# Patient Record
Sex: MALE | Race: WHITE | NOT HISPANIC OR LATINO
[De-identification: names, ages, dates, MRNs, and addresses within clinical notes are randomized per-mention and may not be internally consistent; named-entity substitution may affect disease eponyms.]

---

## 2017-05-26 ENCOUNTER — APPOINTMENT (OUTPATIENT)
Dept: UROLOGY | Facility: CLINIC | Age: 67
End: 2017-05-26

## 2017-05-26 VITALS
WEIGHT: 200 LBS | SYSTOLIC BLOOD PRESSURE: 130 MMHG | RESPIRATION RATE: 16 BRPM | OXYGEN SATURATION: 98 % | HEART RATE: 84 BPM | HEIGHT: 69 IN | BODY MASS INDEX: 29.62 KG/M2 | DIASTOLIC BLOOD PRESSURE: 80 MMHG | TEMPERATURE: 98.2 F

## 2017-05-26 DIAGNOSIS — E78.00 PURE HYPERCHOLESTEROLEMIA, UNSPECIFIED: ICD-10-CM

## 2017-05-26 DIAGNOSIS — N48.89 OTHER SPECIFIED DISORDERS OF PENIS: ICD-10-CM

## 2017-05-26 DIAGNOSIS — N50.89 OTHER SPECIFIED DISORDERS OF THE MALE GENITAL ORGANS: ICD-10-CM

## 2017-05-26 DIAGNOSIS — Z86.79 PERSONAL HISTORY OF OTHER DISEASES OF THE CIRCULATORY SYSTEM: ICD-10-CM

## 2017-06-01 PROBLEM — Z86.79 HISTORY OF HYPERTENSION: Status: RESOLVED | Noted: 2017-05-26 | Resolved: 2017-06-01

## 2017-06-01 PROBLEM — N50.89 SCROTAL EDEMA: Status: ACTIVE | Noted: 2017-06-01

## 2017-06-01 PROBLEM — N48.89 PENILE EDEMA: Status: ACTIVE | Noted: 2017-06-01

## 2017-06-01 PROBLEM — E78.00 HIGH CHOLESTEROL: Status: RESOLVED | Noted: 2017-05-26 | Resolved: 2017-06-01

## 2017-11-29 ENCOUNTER — APPOINTMENT (OUTPATIENT)
Dept: CT IMAGING | Facility: CLINIC | Age: 67
End: 2017-11-29
Payer: MEDICARE

## 2017-11-29 ENCOUNTER — OUTPATIENT (OUTPATIENT)
Dept: OUTPATIENT SERVICES | Facility: HOSPITAL | Age: 67
LOS: 1 days | End: 2017-11-29
Payer: MEDICARE

## 2017-11-29 DIAGNOSIS — Z00.8 ENCOUNTER FOR OTHER GENERAL EXAMINATION: ICD-10-CM

## 2017-11-29 PROCEDURE — G0297: CPT

## 2017-11-29 PROCEDURE — G0297: CPT | Mod: 26

## 2019-02-05 ENCOUNTER — APPOINTMENT (OUTPATIENT)
Dept: INTERNAL MEDICINE | Facility: CLINIC | Age: 69
End: 2019-02-05

## 2019-02-05 ENCOUNTER — RECORD ABSTRACTING (OUTPATIENT)
Age: 69
End: 2019-02-05

## 2019-02-05 DIAGNOSIS — Z87.19 PERSONAL HISTORY OF OTHER DISEASES OF THE DIGESTIVE SYSTEM: ICD-10-CM

## 2019-02-05 DIAGNOSIS — J42 UNSPECIFIED CHRONIC BRONCHITIS: ICD-10-CM

## 2019-02-05 DIAGNOSIS — Z86.79 PERSONAL HISTORY OF OTHER DISEASES OF THE CIRCULATORY SYSTEM: ICD-10-CM

## 2019-02-05 DIAGNOSIS — S39.91XA UNSPECIFIED INJURY OF ABDOMEN, INITIAL ENCOUNTER: ICD-10-CM

## 2019-03-25 ENCOUNTER — MEDICATION RENEWAL (OUTPATIENT)
Age: 69
End: 2019-03-25

## 2019-04-19 ENCOUNTER — RX RENEWAL (OUTPATIENT)
Age: 69
End: 2019-04-19

## 2019-04-23 ENCOUNTER — MEDICATION RENEWAL (OUTPATIENT)
Age: 69
End: 2019-04-23

## 2019-05-20 ENCOUNTER — MEDICATION RENEWAL (OUTPATIENT)
Age: 69
End: 2019-05-20

## 2019-06-03 ENCOUNTER — APPOINTMENT (OUTPATIENT)
Dept: INTERNAL MEDICINE | Facility: CLINIC | Age: 69
End: 2019-06-03
Payer: MEDICARE

## 2019-06-03 VITALS
TEMPERATURE: 97.4 F | DIASTOLIC BLOOD PRESSURE: 65 MMHG | BODY MASS INDEX: 29.62 KG/M2 | SYSTOLIC BLOOD PRESSURE: 128 MMHG | OXYGEN SATURATION: 98 % | WEIGHT: 200 LBS | HEIGHT: 69 IN | RESPIRATION RATE: 16 BRPM | HEART RATE: 47 BPM

## 2019-06-03 PROCEDURE — 99213 OFFICE O/P EST LOW 20 MIN: CPT

## 2019-06-03 NOTE — HISTORY OF PRESENT ILLNESS
[FreeTextEntry1] : Follow up\par \par BP   on meds\par Nocutia interferes with sleep\par Adherent to meds as prescribed\par No weight change\par \par Smoking  unchanged \par Willing to try Chantix [de-identified] : BT  8/18   sugar reasonable\par EKG  reasonable

## 2019-06-03 NOTE — PHYSICAL EXAM
[No Acute Distress] : no acute distress [Well Nourished] : well nourished [Well Developed] : well developed [Well-Appearing] : well-appearing [Normal Sclera/Conjunctiva] : normal sclera/conjunctiva [PERRL] : pupils equal round and reactive to light [EOMI] : extraocular movements intact [Normal Outer Ear/Nose] : the outer ears and nose were normal in appearance [Normal Oropharynx] : the oropharynx was normal [Supple] : supple [No JVD] : no jugular venous distention [No Lymphadenopathy] : no lymphadenopathy [Thyroid Normal, No Nodules] : the thyroid was normal and there were no nodules present [No Respiratory Distress] : no respiratory distress  [Clear to Auscultation] : lungs were clear to auscultation bilaterally [No Accessory Muscle Use] : no accessory muscle use [Normal Rate] : normal rate  [Regular Rhythm] : with a regular rhythm [Normal S1, S2] : normal S1 and S2 [No Murmur] : no murmur heard [No Carotid Bruits] : no carotid bruits [Pedal Pulses Present] : the pedal pulses are present [No Abdominal Bruit] : a ~M bruit was not heard ~T in the abdomen [No Varicosities] : no varicosities [No Edema] : there was no peripheral edema [No Extremity Clubbing/Cyanosis] : no extremity clubbing/cyanosis [No Palpable Aorta] : no palpable aorta [Soft] : abdomen soft [Non Tender] : non-tender [Non-distended] : non-distended [No Masses] : no abdominal mass palpated [No HSM] : no HSM [Normal Posterior Cervical Nodes] : no posterior cervical lymphadenopathy [Normal Bowel Sounds] : normal bowel sounds [Normal Anterior Cervical Nodes] : no anterior cervical lymphadenopathy [No Spinal Tenderness] : no spinal tenderness [No CVA Tenderness] : no CVA  tenderness [Grossly Normal Strength/Tone] : grossly normal strength/tone [No Joint Swelling] : no joint swelling [No Rash] : no rash [Normal Gait] : normal gait [Coordination Grossly Intact] : coordination grossly intact [No Focal Deficits] : no focal deficits [Deep Tendon Reflexes (DTR)] : deep tendon reflexes were 2+ and symmetric [Normal Affect] : the affect was normal [Normal Insight/Judgement] : insight and judgment were intact

## 2019-06-14 ENCOUNTER — RX RENEWAL (OUTPATIENT)
Age: 69
End: 2019-06-14

## 2019-07-03 RX ORDER — IRBESARTAN 300 MG/1
300 TABLET ORAL DAILY
Refills: 0 | Status: DISCONTINUED | COMMUNITY
End: 2019-07-03

## 2019-07-15 RX ORDER — OLMESARTAN MEDOXOMIL 40 MG/1
40 TABLET, FILM COATED ORAL DAILY
Qty: 90 | Refills: 3 | Status: DISCONTINUED | COMMUNITY
Start: 2019-07-03 | End: 2019-07-15

## 2019-07-16 ENCOUNTER — APPOINTMENT (OUTPATIENT)
Dept: INTERNAL MEDICINE | Facility: CLINIC | Age: 69
End: 2019-07-16
Payer: MEDICARE

## 2019-07-17 ENCOUNTER — APPOINTMENT (OUTPATIENT)
Dept: INTERNAL MEDICINE | Facility: CLINIC | Age: 69
End: 2019-07-17
Payer: MEDICARE

## 2019-07-17 VITALS
DIASTOLIC BLOOD PRESSURE: 61 MMHG | HEIGHT: 69 IN | BODY MASS INDEX: 27.85 KG/M2 | HEART RATE: 46 BPM | OXYGEN SATURATION: 95 % | RESPIRATION RATE: 16 BRPM | SYSTOLIC BLOOD PRESSURE: 105 MMHG | WEIGHT: 188 LBS

## 2019-07-17 PROCEDURE — 99214 OFFICE O/P EST MOD 30 MIN: CPT

## 2019-07-17 RX ORDER — LISINOPRIL 40 MG/1
40 TABLET ORAL
Qty: 90 | Refills: 0 | Status: DISCONTINUED | COMMUNITY
Start: 2017-04-18 | End: 2019-07-17

## 2019-07-17 NOTE — PHYSICAL EXAM
[No Respiratory Distress] : no respiratory distress  [No Accessory Muscle Use] : no accessory muscle use [Clear to Auscultation] : lungs were clear to auscultation bilaterally [Normal Percussion] : the chest was normal to percussion [Normal Rate] : normal rate  [Regular Rhythm] : with a regular rhythm [Normal S1, S2] : normal S1 and S2 [No Edema] : there was no peripheral edema [No Extremity Clubbing/Cyanosis] : no extremity clubbing/cyanosis [de-identified] : Pedal pulses decreased in volume    PT/ DP  R and L   Toes warm   capillary filling looks ok

## 2019-07-17 NOTE — HISTORY OF PRESENT ILLNESS
[FreeTextEntry1] : F/U\par \par Just starting Chantix\par Still smoking\par \par Prior prior "Lung Cancer Screening" CT chest neg [de-identified] : New:  \par Walking induced pain in both legs  "entire leg"   "100 feet"   \par Rest   relief in a "couple of minutes" \par

## 2019-07-17 NOTE — ASSESSMENT
[FreeTextEntry1] : Likely has advancing atheromatous disease\par \par COPD   with continuation of smoking

## 2019-07-25 VITALS — HEIGHT: 69 IN | WEIGHT: 199 LBS | BODY MASS INDEX: 29.47 KG/M2

## 2019-07-28 ENCOUNTER — FORM ENCOUNTER (OUTPATIENT)
Age: 69
End: 2019-07-28

## 2019-07-29 ENCOUNTER — APPOINTMENT (OUTPATIENT)
Dept: CT IMAGING | Facility: CLINIC | Age: 69
End: 2019-07-29
Payer: MEDICARE

## 2019-07-29 ENCOUNTER — OUTPATIENT (OUTPATIENT)
Dept: OUTPATIENT SERVICES | Facility: HOSPITAL | Age: 69
LOS: 1 days | End: 2019-07-29
Payer: MEDICARE

## 2019-07-29 DIAGNOSIS — Z00.8 ENCOUNTER FOR OTHER GENERAL EXAMINATION: ICD-10-CM

## 2019-07-29 PROCEDURE — G0297: CPT | Mod: 26

## 2019-07-29 PROCEDURE — G0297: CPT

## 2019-08-20 ENCOUNTER — APPOINTMENT (OUTPATIENT)
Dept: INTERNAL MEDICINE | Facility: CLINIC | Age: 69
End: 2019-08-20
Payer: MEDICARE

## 2019-08-20 ENCOUNTER — NON-APPOINTMENT (OUTPATIENT)
Age: 69
End: 2019-08-20

## 2019-08-20 VITALS
BODY MASS INDEX: 29.54 KG/M2 | DIASTOLIC BLOOD PRESSURE: 60 MMHG | WEIGHT: 200 LBS | HEART RATE: 48 BPM | SYSTOLIC BLOOD PRESSURE: 118 MMHG

## 2019-08-20 DIAGNOSIS — I70.90 UNSPECIFIED ATHEROSCLEROSIS: ICD-10-CM

## 2019-08-20 PROCEDURE — 99214 OFFICE O/P EST MOD 30 MIN: CPT | Mod: 25

## 2019-08-20 PROCEDURE — 36415 COLL VENOUS BLD VENIPUNCTURE: CPT

## 2019-08-20 PROCEDURE — 93000 ELECTROCARDIOGRAM COMPLETE: CPT

## 2019-08-20 NOTE — PHYSICAL EXAM
[No Respiratory Distress] : no respiratory distress  [No Accessory Muscle Use] : no accessory muscle use [Clear to Auscultation] : lungs were clear to auscultation bilaterally [Normal Percussion] : the chest was normal to percussion [Normal Rate] : normal rate  [Regular Rhythm] : with a regular rhythm [Normal S1, S2] : normal S1 and S2 [No Edema] : there was no peripheral edema [No Extremity Clubbing/Cyanosis] : no extremity clubbing/cyanosis [de-identified] : Pedal pulses decreased in volume    PT/ DP  R and L   Toes warm   capillary filling looks ok

## 2019-08-20 NOTE — ASSESSMENT
[FreeTextEntry1] : Same meds\par Continue with "withdrawl" attempt from cigs\par To  see vasc surg\par \par Call for BT report in a week \par R CLIFF  3 m

## 2019-08-20 NOTE — HISTORY OF PRESENT ILLNESS
[FreeTextEntry1] : F/U   several issues  [de-identified] : COPD    CT chest   nodules unchanged  no mass  (lung cancer screening)\par \par Smoking   on Chantix    No reduction yet in cigarettes\par \par Arteriosclerosis / walking impairment   to see vasc surg   [place]\par \par Has not had dental restorations\par \par Adherent to taking meds\par No apparent side effects\par \par

## 2019-08-20 NOTE — RESULTS/DATA
[Ventricular Rate___] : ventricular rate is [unfilled] beats per minute [Sinus Bradycardia] : sinus bradycardia [No Interval Change] : no interval change

## 2019-08-21 LAB
ALBUMIN SERPL ELPH-MCNC: 4.2 G/DL
ALP BLD-CCNC: 98 U/L
ALT SERPL-CCNC: 16 U/L
ANION GAP SERPL CALC-SCNC: 14 MMOL/L
AST SERPL-CCNC: 13 U/L
BASOPHILS # BLD AUTO: 0.06 K/UL
BASOPHILS NFR BLD AUTO: 0.7 %
BILIRUB SERPL-MCNC: 0.5 MG/DL
BUN SERPL-MCNC: 21 MG/DL
CALCIUM SERPL-MCNC: 9.9 MG/DL
CHLORIDE SERPL-SCNC: 102 MMOL/L
CHOLEST SERPL-MCNC: 180 MG/DL
CHOLEST/HDLC SERPL: 5.1 RATIO
CK SERPL-CCNC: 109 U/L
CO2 SERPL-SCNC: 23 MMOL/L
CREAT SERPL-MCNC: 1.42 MG/DL
EOSINOPHIL # BLD AUTO: 0.09 K/UL
EOSINOPHIL NFR BLD AUTO: 1 %
ESTIMATED AVERAGE GLUCOSE: 151 MG/DL
GGT SERPL-CCNC: 36 U/L
GLUCOSE SERPL-MCNC: 116 MG/DL
HBA1C MFR BLD HPLC: 6.9 %
HCT VFR BLD CALC: 45 %
HDLC SERPL-MCNC: 35 MG/DL
HGB BLD-MCNC: 14.2 G/DL
IMM GRANULOCYTES NFR BLD AUTO: 0.3 %
LDLC SERPL CALC-MCNC: 92 MG/DL
LYMPHOCYTES # BLD AUTO: 2.44 K/UL
LYMPHOCYTES NFR BLD AUTO: 26.8 %
MAN DIFF?: NORMAL
MCHC RBC-ENTMCNC: 30.9 PG
MCHC RBC-ENTMCNC: 31.6 GM/DL
MCV RBC AUTO: 98 FL
MONOCYTES # BLD AUTO: 0.72 K/UL
MONOCYTES NFR BLD AUTO: 7.9 %
NEUTROPHILS # BLD AUTO: 5.75 K/UL
NEUTROPHILS NFR BLD AUTO: 63.3 %
PLATELET # BLD AUTO: 287 K/UL
POTASSIUM SERPL-SCNC: 4.4 MMOL/L
PROT SERPL-MCNC: 6.5 G/DL
RBC # BLD: 4.59 M/UL
RBC # FLD: 13.7 %
SODIUM SERPL-SCNC: 139 MMOL/L
TRIGL SERPL-MCNC: 265 MG/DL
WBC # FLD AUTO: 9.09 K/UL

## 2019-08-27 ENCOUNTER — APPOINTMENT (OUTPATIENT)
Dept: VASCULAR SURGERY | Facility: CLINIC | Age: 69
End: 2019-08-27
Payer: MEDICARE

## 2019-08-27 VITALS
WEIGHT: 200 LBS | BODY MASS INDEX: 29.62 KG/M2 | DIASTOLIC BLOOD PRESSURE: 62 MMHG | TEMPERATURE: 97.6 F | HEART RATE: 45 BPM | OXYGEN SATURATION: 96 % | SYSTOLIC BLOOD PRESSURE: 130 MMHG | HEIGHT: 69 IN

## 2019-08-27 PROCEDURE — 99203 OFFICE O/P NEW LOW 30 MIN: CPT

## 2019-08-27 PROCEDURE — 93923 UPR/LXTR ART STDY 3+ LVLS: CPT

## 2019-09-26 ENCOUNTER — MEDICATION RENEWAL (OUTPATIENT)
Age: 69
End: 2019-09-26

## 2019-10-15 ENCOUNTER — APPOINTMENT (OUTPATIENT)
Dept: VASCULAR SURGERY | Facility: CLINIC | Age: 69
End: 2019-10-15
Payer: MEDICARE

## 2019-10-15 VITALS
TEMPERATURE: 97.8 F | BODY MASS INDEX: 29.98 KG/M2 | WEIGHT: 202.38 LBS | HEART RATE: 41 BPM | HEIGHT: 69 IN | DIASTOLIC BLOOD PRESSURE: 78 MMHG | SYSTOLIC BLOOD PRESSURE: 149 MMHG | OXYGEN SATURATION: 97 %

## 2019-10-15 PROCEDURE — 99214 OFFICE O/P EST MOD 30 MIN: CPT

## 2019-10-15 PROCEDURE — 93978 VASCULAR STUDY: CPT

## 2019-11-11 ENCOUNTER — APPOINTMENT (OUTPATIENT)
Dept: INTERNAL MEDICINE | Facility: CLINIC | Age: 69
End: 2019-11-11
Payer: MEDICARE

## 2019-11-11 VITALS — BODY MASS INDEX: 29.33 KG/M2 | WEIGHT: 198 LBS | HEIGHT: 69 IN

## 2019-11-11 VITALS — HEART RATE: 45 BPM | SYSTOLIC BLOOD PRESSURE: 106 MMHG | DIASTOLIC BLOOD PRESSURE: 57 MMHG

## 2019-11-11 DIAGNOSIS — Z82.49 FAMILY HISTORY OF ISCHEMIC HEART DISEASE AND OTHER DISEASES OF THE CIRCULATORY SYSTEM: ICD-10-CM

## 2019-11-11 DIAGNOSIS — Z01.818 ENCOUNTER FOR OTHER PREPROCEDURAL EXAMINATION: ICD-10-CM

## 2019-11-11 DIAGNOSIS — M15.9 POLYOSTEOARTHRITIS, UNSPECIFIED: ICD-10-CM

## 2019-11-11 PROCEDURE — 99214 OFFICE O/P EST MOD 30 MIN: CPT

## 2019-11-11 NOTE — HISTORY OF PRESENT ILLNESS
[No Pertinent Cardiac History] : no history of aortic stenosis, atrial fibrillation, coronary artery disease, recent myocardial infarction, or implantable device/pacemaker [COPD] : COPD [Smoker] : smoker [No Adverse Anesthesia Reaction] : no adverse anesthesia reaction in self or family member [Diabetes] : diabetes [Aortic Stenosis] : no aortic stenosis [Coronary Artery Disease] : no coronary artery disease [Atrial Fibrillation] : no atrial fibrillation [Recent Myocardial Infarction] : no recent myocardial infarction [Implantable Device/Pacemaker] : no implantable device/pacemaker [Sleep Apnea] : no sleep apnea [Chronic Kidney Disease] : no chronic kidney disease [Chronic Anticoagulation] : no chronic anticoagulation [FreeTextEntry1] : Cataract extraction OD and OS with IOL [FreeTextEntry4] : Recently seen by vasc surg\par Claudication\par Declined recommended Pletal\par AAA scan negative\par \par SMOKER\par \par Evidence of advanced arteriosclerosis\par \par Evidence of chronic lung changes associated with smoking but CT chest screening for neoplasm:  Negative 3 months ago.   [FreeTextEntry2] : Thursday Nov 14, 2019 (OD) and Thursday December 5, 2019 (OS)   [FreeTextEntry3] : Maritza Cooper MD

## 2019-11-11 NOTE — PHYSICAL EXAM
[No Respiratory Distress] : no respiratory distress  [Clear to Auscultation] : lungs were clear to auscultation bilaterally [No Accessory Muscle Use] : no accessory muscle use [Normal Percussion] : the chest was normal to percussion [Normal Rate] : normal rate  [Regular Rhythm] : with a regular rhythm [Normal S1, S2] : normal S1 and S2 [No Edema] : there was no peripheral edema [No Extremity Clubbing/Cyanosis] : no extremity clubbing/cyanosis [de-identified] : Pedal pulses decreased in volume    PT/ DP  R and L   Toes warm   capillary filling looks ok

## 2019-11-11 NOTE — RESULTS/DATA
[] : results reviewed [de-identified] : acceptable [de-identified] : acceptable [de-identified] : sinus bradycardia

## 2019-11-11 NOTE — CONSULT LETTER
[Dear  ___] : Dear  [unfilled], [Consult Letter:] : I had the pleasure of evaluating your patient, [unfilled]. [Please see my note below.] : Please see my note below. [Consult Closing:] : Thank you very much for allowing me to participate in the care of this patient.  If you have any questions, please do not hesitate to contact me. [FreeTextEntry2] : Maritza Cooper MD\par [facility]\par [place], [place]

## 2019-11-18 ENCOUNTER — RX RENEWAL (OUTPATIENT)
Age: 69
End: 2019-11-18

## 2019-12-27 ENCOUNTER — RX RENEWAL (OUTPATIENT)
Age: 69
End: 2019-12-27

## 2020-02-16 ENCOUNTER — RX RENEWAL (OUTPATIENT)
Age: 70
End: 2020-02-16

## 2020-03-23 ENCOUNTER — RX RENEWAL (OUTPATIENT)
Age: 70
End: 2020-03-23

## 2020-03-29 ENCOUNTER — NON-APPOINTMENT (OUTPATIENT)
Age: 70
End: 2020-03-29

## 2020-05-05 ENCOUNTER — APPOINTMENT (OUTPATIENT)
Dept: INTERNAL MEDICINE | Facility: CLINIC | Age: 70
End: 2020-05-05
Payer: MEDICARE

## 2020-05-05 VITALS
WEIGHT: 205 LBS | TEMPERATURE: 97.4 F | BODY MASS INDEX: 30.36 KG/M2 | HEIGHT: 69 IN | SYSTOLIC BLOOD PRESSURE: 130 MMHG | HEART RATE: 59 BPM | OXYGEN SATURATION: 97 % | DIASTOLIC BLOOD PRESSURE: 67 MMHG

## 2020-05-05 DIAGNOSIS — M25.562 PAIN IN LEFT KNEE: ICD-10-CM

## 2020-05-05 PROCEDURE — 99204 OFFICE O/P NEW MOD 45 MIN: CPT | Mod: 25

## 2020-05-05 PROCEDURE — 93922 UPR/L XTREMITY ART 2 LEVELS: CPT

## 2020-05-05 PROCEDURE — 99214 OFFICE O/P EST MOD 30 MIN: CPT | Mod: 25

## 2020-05-05 PROCEDURE — G0442 ANNUAL ALCOHOL SCREEN 15 MIN: CPT

## 2020-05-05 RX ORDER — CILOSTAZOL 100 MG/1
100 TABLET ORAL TWICE DAILY
Qty: 60 | Refills: 6 | Status: DISCONTINUED | COMMUNITY
Start: 2019-10-15 | End: 2020-05-05

## 2020-05-05 RX ORDER — FUROSEMIDE 20 MG/1
20 TABLET ORAL
Qty: 30 | Refills: 0 | Status: DISCONTINUED | COMMUNITY
Start: 2017-02-23 | End: 2020-05-05

## 2020-05-05 RX ORDER — VARENICLINE TARTRATE 0.5 (11)-1
0.5 MG X 11 & KIT ORAL
Qty: 1 | Refills: 0 | Status: DISCONTINUED | COMMUNITY
Start: 2019-06-03 | End: 2020-05-05

## 2020-05-05 RX ORDER — KETOCONAZOLE 20 MG/G
2 CREAM TOPICAL
Qty: 120 | Refills: 0 | Status: DISCONTINUED | COMMUNITY
Start: 2017-05-25 | End: 2020-05-05

## 2020-05-05 NOTE — HEALTH RISK ASSESSMENT
[Good] : ~his/her~ current health as good [Never (0 pts)] : Never (0 points) [1 or 2 (0 pts)] : 1 or 2 (0 points) [No falls in past year] : Patient reported no falls in the past year [No] : In the past 12 months have you used drugs other than those required for medical reasons? No [0] : 1) Little interest or pleasure doing things: Not at all (0) [Patient reported colonoscopy was normal] : Patient reported colonoscopy was normal [None] : None [Retired] : retired [With Significant Other] : lives with significant other [] :  [# Of Children ___] : has [unfilled] children [Feels Safe at Home] : Feels safe at home [Fully functional (bathing, dressing, toileting, transferring, walking, feeding)] : Fully functional (bathing, dressing, toileting, transferring, walking, feeding) [Fully functional (using the telephone, shopping, preparing meals, housekeeping, doing laundry, using] : Fully functional and needs no help or supervision to perform IADLs (using the telephone, shopping, preparing meals, housekeeping, doing laundry, using transportation, managing medications and managing finances) [Audit-CScore] : 0 [[id number]] : 0 [Change in mental status noted] : No change in mental status noted [ColonoscopyDate] : 8/2016

## 2020-05-05 NOTE — PHYSICAL EXAM
[No Acute Distress] : no acute distress [Well Nourished] : well nourished [Well Developed] : well developed [Normal Sclera/Conjunctiva] : normal sclera/conjunctiva [Well-Appearing] : well-appearing [PERRL] : pupils equal round and reactive to light [Normal Outer Ear/Nose] : the outer ears and nose were normal in appearance [EOMI] : extraocular movements intact [Normal Oropharynx] : the oropharynx was normal [No JVD] : no jugular venous distention [No Lymphadenopathy] : no lymphadenopathy [Supple] : supple [Thyroid Normal, No Nodules] : the thyroid was normal and there were no nodules present [No Respiratory Distress] : no respiratory distress  [No Accessory Muscle Use] : no accessory muscle use [Clear to Auscultation] : lungs were clear to auscultation bilaterally [Normal Rate] : normal rate  [Regular Rhythm] : with a regular rhythm [Normal S1, S2] : normal S1 and S2 [No Murmur] : no murmur heard [No Carotid Bruits] : no carotid bruits [No Abdominal Bruit] : a ~M bruit was not heard ~T in the abdomen [No Varicosities] : no varicosities [No Edema] : there was no peripheral edema [No Palpable Aorta] : no palpable aorta [No Extremity Clubbing/Cyanosis] : no extremity clubbing/cyanosis [Soft] : abdomen soft [Non Tender] : non-tender [Non-distended] : non-distended [No Masses] : no abdominal mass palpated [No HSM] : no HSM [Normal Bowel Sounds] : normal bowel sounds [Normal Posterior Cervical Nodes] : no posterior cervical lymphadenopathy [Normal Anterior Cervical Nodes] : no anterior cervical lymphadenopathy [No CVA Tenderness] : no CVA  tenderness [No Spinal Tenderness] : no spinal tenderness [No Joint Swelling] : no joint swelling [Grossly Normal Strength/Tone] : grossly normal strength/tone [No Rash] : no rash [Coordination Grossly Intact] : coordination grossly intact [No Focal Deficits] : no focal deficits [Normal Gait] : normal gait [Deep Tendon Reflexes (DTR)] : deep tendon reflexes were 2+ and symmetric [Normal Affect] : the affect was normal [Normal Insight/Judgement] : insight and judgment were intact [de-identified] : left knee tender but no warmth or swelling [de-identified] : decreased pedal pulses

## 2020-05-05 NOTE — HISTORY OF PRESENT ILLNESS
[FreeTextEntry1] : New patient taking over care. HTN\par  COPD, current smoker. , HTN and cholesterol. \par  he is c/o pain in his left knee.  cant walk any distances he gets pain in his knee . he gets pain in his calves and his left leg after walking a few minutes. advil helps a little\par  had vascular w/u about 8 months by Dr. Tran

## 2020-05-12 DIAGNOSIS — M17.10 UNILATERAL PRIMARY OSTEOARTHRITIS, UNSPECIFIED KNEE: ICD-10-CM

## 2020-05-19 ENCOUNTER — RX RENEWAL (OUTPATIENT)
Age: 70
End: 2020-05-19

## 2020-08-06 VITALS — WEIGHT: 199 LBS | HEIGHT: 69 IN | BODY MASS INDEX: 29.47 KG/M2

## 2020-08-06 DIAGNOSIS — Z12.2 ENCOUNTER FOR SCREENING FOR MALIGNANT NEOPLASM OF RESPIRATORY ORGANS: ICD-10-CM

## 2020-08-06 NOTE — PLAN
[[facility]] : referred to [facility] [phone] [Smoking Cessation] : smoking cessation [Regular follow-up with healthcare provider] : regular follow-up with healthcare provider [Regular Exercise] : regular exercise [Age appropriate screenings] : Age appropriate screenings [FreeTextEntry1] : HIs LDCT is scheduled for 8/7/20 at the [place] location.  He will follow up with Dr. Paz for the results.

## 2020-08-06 NOTE — ASSESSMENT
[Discussed Cessation Strategies] : cessation strategies were discussed [Ready] : Patient is ready for cessation intervention [Discussed Risks and Advised to Quit Smoking] : Discussed risks and advised to quit smoking [Contemplation] : Contemplation: The patient is considering quitting smoking

## 2020-08-06 NOTE — REASON FOR VISIT
[Annual Follow-Up] : an annual follow-up visit [Low-Dose CT Screening Discussion] : low-dose CT lung cancer screening discussion [Review of Eligibility] : review of eligibility

## 2020-08-06 NOTE — HISTORY OF PRESENT ILLNESS
[Current] : current smoker [TextBox_13] : He denies hemoptysis, denies new cough, denies unexplained weight loss.\par He is a current smoker with a 56 pack year smoking history (1PPD x 56 years). He denies family h/o lung cancer. He is referred by Dr. Alden Paz.\par  [_____ pack-years] : [unfilled] pack-years

## 2020-08-07 ENCOUNTER — APPOINTMENT (OUTPATIENT)
Dept: CT IMAGING | Facility: CLINIC | Age: 70
End: 2020-08-07
Payer: MEDICARE

## 2020-08-07 ENCOUNTER — OUTPATIENT (OUTPATIENT)
Dept: OUTPATIENT SERVICES | Facility: HOSPITAL | Age: 70
LOS: 1 days | End: 2020-08-07
Payer: MEDICARE

## 2020-08-07 DIAGNOSIS — Z00.8 ENCOUNTER FOR OTHER GENERAL EXAMINATION: ICD-10-CM

## 2020-08-07 PROCEDURE — G0297: CPT | Mod: 26

## 2020-08-07 PROCEDURE — G0297: CPT

## 2020-08-20 ENCOUNTER — APPOINTMENT (OUTPATIENT)
Dept: INTERNAL MEDICINE | Facility: CLINIC | Age: 70
End: 2020-08-20
Payer: MEDICARE

## 2020-08-20 VITALS
DIASTOLIC BLOOD PRESSURE: 74 MMHG | HEIGHT: 69 IN | BODY MASS INDEX: 29.62 KG/M2 | WEIGHT: 200 LBS | OXYGEN SATURATION: 96 % | SYSTOLIC BLOOD PRESSURE: 134 MMHG | HEART RATE: 65 BPM

## 2020-08-20 PROCEDURE — 99214 OFFICE O/P EST MOD 30 MIN: CPT

## 2020-09-23 ENCOUNTER — APPOINTMENT (OUTPATIENT)
Dept: INTERNAL MEDICINE | Facility: CLINIC | Age: 70
End: 2020-09-23

## 2020-10-17 DIAGNOSIS — Z01.818 ENCOUNTER FOR OTHER PREPROCEDURAL EXAMINATION: ICD-10-CM

## 2020-10-18 ENCOUNTER — APPOINTMENT (OUTPATIENT)
Dept: DISASTER EMERGENCY | Facility: CLINIC | Age: 70
End: 2020-10-18

## 2020-10-20 LAB — SARS-COV-2 N GENE NPH QL NAA+PROBE: NOT DETECTED

## 2020-10-21 ENCOUNTER — APPOINTMENT (OUTPATIENT)
Dept: INTERNAL MEDICINE | Facility: CLINIC | Age: 70
End: 2020-10-21
Payer: MEDICARE

## 2020-10-21 PROCEDURE — 94727 GAS DIL/WSHOT DETER LNG VOL: CPT

## 2020-10-21 PROCEDURE — 94010 BREATHING CAPACITY TEST: CPT

## 2020-10-21 PROCEDURE — 94729 DIFFUSING CAPACITY: CPT

## 2020-12-11 ENCOUNTER — APPOINTMENT (OUTPATIENT)
Dept: INTERNAL MEDICINE | Facility: CLINIC | Age: 70
End: 2020-12-11
Payer: MEDICARE

## 2020-12-11 VITALS
HEART RATE: 66 BPM | WEIGHT: 199 LBS | BODY MASS INDEX: 29.47 KG/M2 | DIASTOLIC BLOOD PRESSURE: 61 MMHG | OXYGEN SATURATION: 98 % | SYSTOLIC BLOOD PRESSURE: 130 MMHG | HEIGHT: 69 IN

## 2020-12-11 DIAGNOSIS — F17.209 NICOTINE DEPENDENCE, UNSPECIFIED, WITH UNSPECIFIED NICOTINE-INDUCED DISORDERS: ICD-10-CM

## 2020-12-11 DIAGNOSIS — R06.02 SHORTNESS OF BREATH: ICD-10-CM

## 2020-12-11 PROCEDURE — 99213 OFFICE O/P EST LOW 20 MIN: CPT

## 2020-12-11 PROCEDURE — 99072 ADDL SUPL MATRL&STAF TM PHE: CPT

## 2020-12-11 NOTE — PHYSICAL EXAM
[Normal] : no acute distress, well nourished, well developed and well-appearing [Normal Sclera/Conjunctiva] : normal sclera/conjunctiva [Normal Outer Ear/Nose] : the outer ears and nose were normal in appearance [No JVD] : no jugular venous distention [No Respiratory Distress] : no respiratory distress  [No Accessory Muscle Use] : no accessory muscle use [Clear to Auscultation] : lungs were clear to auscultation bilaterally [Normal Rate] : normal rate  [Regular Rhythm] : with a regular rhythm [Normal S1, S2] : normal S1 and S2 [No Edema] : there was no peripheral edema

## 2020-12-11 NOTE — HISTORY OF PRESENT ILLNESS
[FreeTextEntry1] : feeling  ice pick stabbing  pains in left anlke  and right knee. says when goes  down  on knees legs feel to weak  to pull himself up. he walks only twenty foot before his calf and upper lug bother him   says he tried nicotine gum for a  week and not the  patch.  he is still sob  but all -pfts

## 2020-12-18 ENCOUNTER — RX RENEWAL (OUTPATIENT)
Age: 70
End: 2020-12-18

## 2020-12-23 ENCOUNTER — NON-APPOINTMENT (OUTPATIENT)
Age: 70
End: 2020-12-23

## 2020-12-23 ENCOUNTER — TRANSCRIPTION ENCOUNTER (OUTPATIENT)
Age: 70
End: 2020-12-23

## 2020-12-23 ENCOUNTER — APPOINTMENT (OUTPATIENT)
Dept: INTERNAL MEDICINE | Facility: CLINIC | Age: 70
End: 2020-12-23
Payer: MEDICARE

## 2020-12-23 VITALS
DIASTOLIC BLOOD PRESSURE: 74 MMHG | OXYGEN SATURATION: 98 % | HEART RATE: 52 BPM | BODY MASS INDEX: 29.77 KG/M2 | SYSTOLIC BLOOD PRESSURE: 159 MMHG | WEIGHT: 201 LBS | HEIGHT: 69 IN

## 2020-12-23 DIAGNOSIS — Z86.39 PERSONAL HISTORY OF OTHER ENDOCRINE, NUTRITIONAL AND METABOLIC DISEASE: ICD-10-CM

## 2020-12-23 PROCEDURE — 99214 OFFICE O/P EST MOD 30 MIN: CPT | Mod: 25

## 2020-12-23 PROCEDURE — 93000 ELECTROCARDIOGRAM COMPLETE: CPT

## 2020-12-23 PROCEDURE — 99072 ADDL SUPL MATRL&STAF TM PHE: CPT

## 2020-12-23 NOTE — HISTORY OF PRESENT ILLNESS
[FreeTextEntry1] : He saw neurologist because of his leg problems . Neurologist told to see me due to irregular heartbeat and prescribed duloxetine\par  diabetes under control

## 2020-12-30 ENCOUNTER — NON-APPOINTMENT (OUTPATIENT)
Age: 70
End: 2020-12-30

## 2020-12-31 ENCOUNTER — APPOINTMENT (OUTPATIENT)
Dept: INTERNAL MEDICINE | Facility: CLINIC | Age: 70
End: 2020-12-31
Payer: MEDICARE

## 2020-12-31 PROCEDURE — 99442: CPT

## 2020-12-31 RX ORDER — LOSARTAN POTASSIUM 100 MG/1
100 TABLET, FILM COATED ORAL
Qty: 90 | Refills: 2 | Status: DISCONTINUED | COMMUNITY
Start: 2019-07-15 | End: 2020-12-31

## 2021-01-06 LAB
BASOPHILS # BLD AUTO: 0.07 K/UL
BASOPHILS NFR BLD AUTO: 0.7 %
EOSINOPHIL # BLD AUTO: 0.24 K/UL
EOSINOPHIL NFR BLD AUTO: 2.3 %
HCT VFR BLD CALC: 46.4 %
HGB BLD-MCNC: 15.3 G/DL
IMM GRANULOCYTES NFR BLD AUTO: 0.6 %
LYMPHOCYTES # BLD AUTO: 2.58 K/UL
LYMPHOCYTES NFR BLD AUTO: 24.5 %
MAN DIFF?: NORMAL
MCHC RBC-ENTMCNC: 31.4 PG
MCHC RBC-ENTMCNC: 33 GM/DL
MCV RBC AUTO: 95.3 FL
MONOCYTES # BLD AUTO: 0.84 K/UL
MONOCYTES NFR BLD AUTO: 8 %
NEUTROPHILS # BLD AUTO: 6.73 K/UL
NEUTROPHILS NFR BLD AUTO: 63.9 %
PLATELET # BLD AUTO: 368 K/UL
RBC # BLD: 4.87 M/UL
RBC # FLD: 13.2 %
WBC # FLD AUTO: 10.52 K/UL

## 2021-01-14 ENCOUNTER — TRANSCRIPTION ENCOUNTER (OUTPATIENT)
Age: 71
End: 2021-01-14

## 2021-02-03 ENCOUNTER — APPOINTMENT (OUTPATIENT)
Dept: INTERNAL MEDICINE | Facility: CLINIC | Age: 71
End: 2021-02-03
Payer: MEDICARE

## 2021-02-03 VITALS
HEART RATE: 72 BPM | SYSTOLIC BLOOD PRESSURE: 127 MMHG | TEMPERATURE: 97.8 F | HEIGHT: 69 IN | OXYGEN SATURATION: 98 % | WEIGHT: 197 LBS | DIASTOLIC BLOOD PRESSURE: 74 MMHG | BODY MASS INDEX: 29.18 KG/M2

## 2021-02-03 PROCEDURE — 99214 OFFICE O/P EST MOD 30 MIN: CPT

## 2021-02-03 PROCEDURE — 99072 ADDL SUPL MATRL&STAF TM PHE: CPT

## 2021-02-03 RX ORDER — TRIAMTERENE AND HYDROCHLOROTHIAZIDE 37.5; 25 MG/1; MG/1
37.5-25 CAPSULE ORAL
Qty: 90 | Refills: 3 | Status: DISCONTINUED | COMMUNITY
Start: 2017-04-06 | End: 2021-02-03

## 2021-02-03 NOTE — HISTORY OF PRESENT ILLNESS
[FreeTextEntry1] : F/u BP On new medications. His BP's at home have been good\par  had pfizer shot number 1

## 2021-02-10 LAB
ALBUMIN SERPL ELPH-MCNC: 4.4 G/DL
ALP BLD-CCNC: 113 U/L
ALT SERPL-CCNC: 20 U/L
ANION GAP SERPL CALC-SCNC: 15 MMOL/L
AST SERPL-CCNC: 16 U/L
BASOPHILS # BLD AUTO: 0.1 K/UL
BASOPHILS NFR BLD AUTO: 0.9 %
BILIRUB SERPL-MCNC: 0.6 MG/DL
BUN SERPL-MCNC: 31 MG/DL
CALCIUM SERPL-MCNC: 10.1 MG/DL
CHLORIDE SERPL-SCNC: 101 MMOL/L
CHOLEST SERPL-MCNC: 192 MG/DL
CK SERPL-CCNC: 115 U/L
CO2 SERPL-SCNC: 20 MMOL/L
CREAT SERPL-MCNC: 1.78 MG/DL
EOSINOPHIL # BLD AUTO: 0.25 K/UL
EOSINOPHIL NFR BLD AUTO: 2.2 %
ESTIMATED AVERAGE GLUCOSE: 177 MG/DL
GLUCOSE SERPL-MCNC: 189 MG/DL
HBA1C MFR BLD HPLC: 7.8 %
HCT VFR BLD CALC: 46.3 %
HDLC SERPL-MCNC: 41 MG/DL
HGB BLD-MCNC: 14.6 G/DL
IMM GRANULOCYTES NFR BLD AUTO: 0.4 %
LDLC SERPL CALC-MCNC: 110 MG/DL
LYMPHOCYTES # BLD AUTO: 2.47 K/UL
LYMPHOCYTES NFR BLD AUTO: 21.9 %
MAN DIFF?: NORMAL
MCHC RBC-ENTMCNC: 30.2 PG
MCHC RBC-ENTMCNC: 31.5 GM/DL
MCV RBC AUTO: 95.7 FL
MONOCYTES # BLD AUTO: 0.88 K/UL
MONOCYTES NFR BLD AUTO: 7.8 %
NEUTROPHILS # BLD AUTO: 7.54 K/UL
NEUTROPHILS NFR BLD AUTO: 66.8 %
NONHDLC SERPL-MCNC: 151 MG/DL
PLATELET # BLD AUTO: 384 K/UL
POTASSIUM SERPL-SCNC: 4.2 MMOL/L
PROT SERPL-MCNC: 6.7 G/DL
RBC # BLD: 4.84 M/UL
RBC # FLD: 14.2 %
SODIUM SERPL-SCNC: 136 MMOL/L
TRIGL SERPL-MCNC: 206 MG/DL
WBC # FLD AUTO: 11.29 K/UL

## 2021-03-16 ENCOUNTER — RX RENEWAL (OUTPATIENT)
Age: 71
End: 2021-03-16

## 2021-05-12 ENCOUNTER — APPOINTMENT (OUTPATIENT)
Dept: INTERNAL MEDICINE | Facility: CLINIC | Age: 71
End: 2021-05-12
Payer: MEDICARE

## 2021-05-12 ENCOUNTER — NON-APPOINTMENT (OUTPATIENT)
Age: 71
End: 2021-05-12

## 2021-05-12 VITALS
BODY MASS INDEX: 28.35 KG/M2 | HEART RATE: 72 BPM | DIASTOLIC BLOOD PRESSURE: 76 MMHG | HEIGHT: 69 IN | SYSTOLIC BLOOD PRESSURE: 130 MMHG

## 2021-05-12 VITALS — WEIGHT: 192 LBS | BODY MASS INDEX: 28.35 KG/M2

## 2021-05-12 DIAGNOSIS — K80.20 CALCULUS OF GALLBLADDER W/OUT CHOLECYSTITIS W/OUT OBSTRUCTION: ICD-10-CM

## 2021-05-12 DIAGNOSIS — R01.1 CARDIAC MURMUR, UNSPECIFIED: ICD-10-CM

## 2021-05-12 DIAGNOSIS — H26.9 UNSPECIFIED CATARACT: ICD-10-CM

## 2021-05-12 DIAGNOSIS — K40.20 BILATERAL INGUINAL HERNIA, W/OUT OBSTRUCTION OR GANGRENE, NOT SPECIFIED AS RECURRENT: ICD-10-CM

## 2021-05-12 PROCEDURE — G0442 ANNUAL ALCOHOL SCREEN 15 MIN: CPT

## 2021-05-12 PROCEDURE — G0444 DEPRESSION SCREEN ANNUAL: CPT

## 2021-05-12 PROCEDURE — G0439: CPT

## 2021-05-12 PROCEDURE — 81003 URINALYSIS AUTO W/O SCOPE: CPT | Mod: QW

## 2021-05-12 PROCEDURE — 36415 COLL VENOUS BLD VENIPUNCTURE: CPT

## 2021-05-12 PROCEDURE — 93000 ELECTROCARDIOGRAM COMPLETE: CPT | Mod: 59

## 2021-05-12 PROCEDURE — 99072 ADDL SUPL MATRL&STAF TM PHE: CPT

## 2021-05-12 NOTE — HISTORY OF PRESENT ILLNESS
[FreeTextEntry1] : HTN.\par COPD - current smoker. COPD. every joint in his body aches. had lung CT 8/2020\par  he has claudication. never followed. \par c/o post nasal drip

## 2021-05-12 NOTE — HEALTH RISK ASSESSMENT
[Good] : ~his/her~  mood as  good [] : Yes [30 or more] : 30 or more [No] : In the past 12 months have you used drugs other than those required for medical reasons? No [No falls in past year] : Patient reported no falls in the past year [0] : 2) Feeling down, depressed, or hopeless: Not at all (0) [Audit-CScore] : 0 [[id number]] : 0 [ColonoscopyDate] : 8/2016

## 2021-06-04 LAB
25(OH)D3 SERPL-MCNC: 25.8 NG/ML
ALBUMIN SERPL ELPH-MCNC: 4.4 G/DL
ALP BLD-CCNC: 127 U/L
ALT SERPL-CCNC: 18 U/L
ANION GAP SERPL CALC-SCNC: 14 MMOL/L
AST SERPL-CCNC: 16 U/L
BASOPHILS # BLD AUTO: 0.08 K/UL
BASOPHILS NFR BLD AUTO: 0.8 %
BILIRUB SERPL-MCNC: 0.4 MG/DL
BUN SERPL-MCNC: 15 MG/DL
CALCIUM SERPL-MCNC: 10 MG/DL
CHLORIDE SERPL-SCNC: 101 MMOL/L
CHOLEST SERPL-MCNC: 197 MG/DL
CK SERPL-CCNC: 77 U/L
CO2 SERPL-SCNC: 25 MMOL/L
CREAT SERPL-MCNC: 1.22 MG/DL
EOSINOPHIL # BLD AUTO: 0.34 K/UL
EOSINOPHIL NFR BLD AUTO: 3.2 %
ESTIMATED AVERAGE GLUCOSE: 154 MG/DL
GLUCOSE SERPL-MCNC: 90 MG/DL
HBA1C MFR BLD HPLC: 7 %
HCT VFR BLD CALC: 50.3 %
HDLC SERPL-MCNC: 43 MG/DL
HGB BLD-MCNC: 15.7 G/DL
IMM GRANULOCYTES NFR BLD AUTO: 0.3 %
LDLC SERPL CALC-MCNC: 108 MG/DL
LYMPHOCYTES # BLD AUTO: 2.83 K/UL
LYMPHOCYTES NFR BLD AUTO: 26.7 %
MAN DIFF?: NORMAL
MCHC RBC-ENTMCNC: 29.4 PG
MCHC RBC-ENTMCNC: 31.2 GM/DL
MCV RBC AUTO: 94.2 FL
MONOCYTES # BLD AUTO: 0.79 K/UL
MONOCYTES NFR BLD AUTO: 7.5 %
NEUTROPHILS # BLD AUTO: 6.52 K/UL
NEUTROPHILS NFR BLD AUTO: 61.5 %
NONHDLC SERPL-MCNC: 154 MG/DL
PLATELET # BLD AUTO: 388 K/UL
POTASSIUM SERPL-SCNC: 4.7 MMOL/L
PROT SERPL-MCNC: 7.1 G/DL
RBC # BLD: 5.34 M/UL
RBC # FLD: 13.5 %
SODIUM SERPL-SCNC: 141 MMOL/L
T4 FREE SERPL-MCNC: 1.1 NG/DL
T4 SERPL-MCNC: 8.3 UG/DL
TRIGL SERPL-MCNC: 232 MG/DL
TSH SERPL-ACNC: 1.26 UIU/ML
VIT B12 SERPL-MCNC: 375 PG/ML
WBC # FLD AUTO: 10.59 K/UL

## 2021-06-18 ENCOUNTER — RX RENEWAL (OUTPATIENT)
Age: 71
End: 2021-06-18

## 2021-07-29 ENCOUNTER — NON-APPOINTMENT (OUTPATIENT)
Age: 71
End: 2021-07-29

## 2021-09-02 ENCOUNTER — APPOINTMENT (OUTPATIENT)
Dept: CARDIOLOGY | Facility: CLINIC | Age: 71
End: 2021-09-02
Payer: MEDICARE

## 2021-09-02 VITALS — DIASTOLIC BLOOD PRESSURE: 50 MMHG | SYSTOLIC BLOOD PRESSURE: 120 MMHG

## 2021-09-02 VITALS — HEART RATE: 75 BPM | SYSTOLIC BLOOD PRESSURE: 155 MMHG | DIASTOLIC BLOOD PRESSURE: 72 MMHG

## 2021-09-02 DIAGNOSIS — R00.1 BRADYCARDIA, UNSPECIFIED: ICD-10-CM

## 2021-09-02 PROCEDURE — 99204 OFFICE O/P NEW MOD 45 MIN: CPT

## 2021-09-02 NOTE — DISCUSSION/SUMMARY
[FreeTextEntry1] : Sinus bradycardia: Doubt that HR in the 20s was accurate as patient was completely asymptomatic. He does have a slow resting HR with baseline rates in the 40s-50s, asymptomatic. Occasional PAC's, PVC's, agree with holding beta blocker\par \par HTN: Improved on recheck, cont amlodipine, olmesartan\par \par GINNY: Cont statin, off asa due to easy bruising. Repeat carotid duplex. Lipids borderline on statin \par \par Repeat echo\par \par Needs surveillance for AAA\par \par Smoking cessation encouraged

## 2021-09-02 NOTE — PHYSICAL EXAM
[Well Developed] : well developed [Well Nourished] : well nourished [Normal Conjunctiva] : normal conjunctiva [No Acute Distress] : no acute distress [Normal Venous Pressure] : normal venous pressure [No Carotid Bruit] : no carotid bruit [Normal S1, S2] : normal S1, S2 [No Murmur] : no murmur [No Rub] : no rub [No Gallop] : no gallop [Clear Lung Fields] : clear lung fields [Good Air Entry] : good air entry [No Respiratory Distress] : no respiratory distress  [Soft] : abdomen soft [Non Tender] : non-tender [No Masses/organomegaly] : no masses/organomegaly [Normal Bowel Sounds] : normal bowel sounds [Normal Gait] : normal gait [No Edema] : no edema [No Cyanosis] : no cyanosis [No Clubbing] : no clubbing [No Varicosities] : no varicosities [No Rash] : no rash [No Skin Lesions] : no skin lesions [No Focal Deficits] : no focal deficits [Moves all extremities] : moves all extremities [Normal Speech] : normal speech [Alert and Oriented] : alert and oriented [Normal memory] : normal memory

## 2021-09-02 NOTE — HISTORY OF PRESENT ILLNESS
[FreeTextEntry1] : 71M with HTN, HLD, carotid stenosis, AAA, current smoker who presents for evaluation of bradycardia\par \par Pt was told during a spinal injection that his HR was down to 25(?). He reported no symptoms at the time. He had been on metoprolol which was placed on hold per Dr. FINCH. Feels well without CP, SOB, dizziness, lightheadedness, syncope. \par \par Current smoker. Drives a limo. Denies family hx of CAD\par \par ECG: SR with PVC, PAC\par \par Amlodipine 10mg, Olmesartan 40mg, Simvastatin 40mg

## 2021-09-03 ENCOUNTER — RESULT REVIEW (OUTPATIENT)
Age: 71
End: 2021-09-03

## 2021-09-14 ENCOUNTER — APPOINTMENT (OUTPATIENT)
Dept: INTERNAL MEDICINE | Facility: CLINIC | Age: 71
End: 2021-09-14

## 2021-09-15 ENCOUNTER — APPOINTMENT (OUTPATIENT)
Dept: THORACIC SURGERY | Facility: CLINIC | Age: 71
End: 2021-09-15
Payer: MEDICARE

## 2021-09-15 ENCOUNTER — NON-APPOINTMENT (OUTPATIENT)
Age: 71
End: 2021-09-15

## 2021-09-15 VITALS — WEIGHT: 192 LBS | BODY MASS INDEX: 28.44 KG/M2 | HEIGHT: 69 IN

## 2021-09-15 PROCEDURE — 99406 BEHAV CHNG SMOKING 3-10 MIN: CPT

## 2021-09-15 PROCEDURE — G0296 VISIT TO DETERM LDCT ELIG: CPT

## 2021-09-16 ENCOUNTER — APPOINTMENT (OUTPATIENT)
Dept: CT IMAGING | Facility: CLINIC | Age: 71
End: 2021-09-16
Payer: MEDICARE

## 2021-09-16 ENCOUNTER — OUTPATIENT (OUTPATIENT)
Dept: OUTPATIENT SERVICES | Facility: HOSPITAL | Age: 71
LOS: 1 days | End: 2021-09-16
Payer: MEDICARE

## 2021-09-16 DIAGNOSIS — Z00.8 ENCOUNTER FOR OTHER GENERAL EXAMINATION: ICD-10-CM

## 2021-09-16 PROCEDURE — 71271 CT THORAX LUNG CANCER SCR C-: CPT

## 2021-09-16 PROCEDURE — 71271 CT THORAX LUNG CANCER SCR C-: CPT | Mod: 26

## 2021-09-25 NOTE — HISTORY OF PRESENT ILLNESS
[TextBox_13] : He denies hemoptysis, denies new cough, denies unexplained weight loss.\par He is a current smoker with a 57 pack year smoking history (1PPD x 57 years).  He is referred by Dr. Alden Paz.

## 2021-09-25 NOTE — PLAN
[FreeTextEntry1] : His LDCT is scheduled for 9/16/21 at the [place] location.  He will follow up with Dr. Paz for the results.

## 2021-09-27 ENCOUNTER — APPOINTMENT (OUTPATIENT)
Dept: INTERNAL MEDICINE | Facility: CLINIC | Age: 71
End: 2021-09-27
Payer: MEDICARE

## 2021-09-27 PROCEDURE — 93306 TTE W/DOPPLER COMPLETE: CPT

## 2021-11-23 ENCOUNTER — NON-APPOINTMENT (OUTPATIENT)
Age: 71
End: 2021-11-23

## 2021-12-16 ENCOUNTER — APPOINTMENT (OUTPATIENT)
Dept: INTERNAL MEDICINE | Facility: CLINIC | Age: 71
End: 2021-12-16
Payer: MEDICARE

## 2021-12-16 VITALS
HEART RATE: 55 BPM | WEIGHT: 186 LBS | DIASTOLIC BLOOD PRESSURE: 72 MMHG | SYSTOLIC BLOOD PRESSURE: 138 MMHG | TEMPERATURE: 97.6 F | OXYGEN SATURATION: 97 % | BODY MASS INDEX: 27.47 KG/M2

## 2021-12-16 VITALS — SYSTOLIC BLOOD PRESSURE: 140 MMHG | DIASTOLIC BLOOD PRESSURE: 70 MMHG

## 2021-12-16 DIAGNOSIS — F17.218 NICOTINE DEPENDENCE, CIGARETTES, WITH OTHER NICOTINE-INDUCED DISORDERS: ICD-10-CM

## 2021-12-16 PROCEDURE — 99213 OFFICE O/P EST LOW 20 MIN: CPT | Mod: 25

## 2021-12-16 PROCEDURE — 99406 BEHAV CHNG SMOKING 3-10 MIN: CPT

## 2021-12-16 NOTE — HISTORY OF PRESENT ILLNESS
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

## 2021-12-16 NOTE — PLAN
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

## 2021-12-16 NOTE — REVIEW OF SYSTEMS
[Claudication] : leg claudication [Negative] : Respiratory [Chest Pain] : no chest pain [Palpitations] : no palpitations [Lower Ext Edema] : no lower extremity edema

## 2021-12-16 NOTE — COUNSELING
[None] : None [Good understanding] : Patient has a good understanding of lifestyle changes and steps needed to achieve self management goal [de-identified] : low-salt diet.

## 2021-12-16 NOTE — ASSESSMENT
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

## 2021-12-20 LAB
ALBUMIN SERPL ELPH-MCNC: 4.3 G/DL
ALP BLD-CCNC: 117 U/L
ALT SERPL-CCNC: 15 U/L
ANION GAP SERPL CALC-SCNC: 14 MMOL/L
AST SERPL-CCNC: 13 U/L
BASOPHILS # BLD AUTO: 0.07 K/UL
BASOPHILS NFR BLD AUTO: 0.7 %
BILIRUB SERPL-MCNC: 0.3 MG/DL
BUN SERPL-MCNC: 18 MG/DL
CALCIUM SERPL-MCNC: 10 MG/DL
CHLORIDE SERPL-SCNC: 105 MMOL/L
CHOLEST SERPL-MCNC: 204 MG/DL
CO2 SERPL-SCNC: 25 MMOL/L
CREAT SERPL-MCNC: 1.34 MG/DL
EOSINOPHIL # BLD AUTO: 0.25 K/UL
EOSINOPHIL NFR BLD AUTO: 2.6 %
ESTIMATED AVERAGE GLUCOSE: 154 MG/DL
GLUCOSE SERPL-MCNC: 100 MG/DL
HBA1C MFR BLD HPLC: 7 %
HCT VFR BLD CALC: 46 %
HDLC SERPL-MCNC: 42 MG/DL
HGB BLD-MCNC: 15.1 G/DL
IMM GRANULOCYTES NFR BLD AUTO: 0.2 %
LDLC SERPL CALC-MCNC: 119 MG/DL
LYMPHOCYTES # BLD AUTO: 3.39 K/UL
LYMPHOCYTES NFR BLD AUTO: 34.8 %
MAN DIFF?: NORMAL
MCHC RBC-ENTMCNC: 31.4 PG
MCHC RBC-ENTMCNC: 32.8 GM/DL
MCV RBC AUTO: 95.6 FL
MONOCYTES # BLD AUTO: 0.84 K/UL
MONOCYTES NFR BLD AUTO: 8.6 %
NEUTROPHILS # BLD AUTO: 5.16 K/UL
NEUTROPHILS NFR BLD AUTO: 53.1 %
NONHDLC SERPL-MCNC: 161 MG/DL
PLATELET # BLD AUTO: 367 K/UL
POTASSIUM SERPL-SCNC: 4.6 MMOL/L
PROT SERPL-MCNC: 6.9 G/DL
RBC # BLD: 4.81 M/UL
RBC # FLD: 13.3 %
SODIUM SERPL-SCNC: 144 MMOL/L
TRIGL SERPL-MCNC: 208 MG/DL
TSH SERPL-ACNC: 2.19 UIU/ML
WBC # FLD AUTO: 9.73 K/UL

## 2022-01-10 ENCOUNTER — APPOINTMENT (OUTPATIENT)
Dept: INTERNAL MEDICINE | Facility: CLINIC | Age: 72
End: 2022-01-10
Payer: MEDICARE

## 2022-01-10 VITALS
HEIGHT: 69 IN | OXYGEN SATURATION: 97 % | TEMPERATURE: 97.6 F | BODY MASS INDEX: 27.25 KG/M2 | HEART RATE: 55 BPM | WEIGHT: 184 LBS | SYSTOLIC BLOOD PRESSURE: 148 MMHG | DIASTOLIC BLOOD PRESSURE: 75 MMHG

## 2022-01-10 VITALS — SYSTOLIC BLOOD PRESSURE: 148 MMHG | DIASTOLIC BLOOD PRESSURE: 56 MMHG | RESPIRATION RATE: 16 BRPM

## 2022-01-10 PROCEDURE — 99213 OFFICE O/P EST LOW 20 MIN: CPT | Mod: 25

## 2022-01-10 PROCEDURE — 99406 BEHAV CHNG SMOKING 3-10 MIN: CPT

## 2022-01-10 RX ORDER — SIMVASTATIN 40 MG/1
40 TABLET, FILM COATED ORAL
Qty: 90 | Refills: 3 | Status: DISCONTINUED | COMMUNITY
Start: 2017-01-27 | End: 2022-01-10

## 2022-01-10 NOTE — HISTORY OF PRESENT ILLNESS
[FreeTextEntry1] : Follow up for HTN  [de-identified] : Patient presents for follow up for his chronic medical conditions. He reports compliance with all prescribed medical therapy and dietary restrictions. He takes his medication at night. His blood pressure today Home BP machine 148/73\par

## 2022-01-10 NOTE — PHYSICAL EXAM
[No Acute Distress] : no acute distress [Well Nourished] : well nourished [Well Developed] : well developed [Well-Appearing] : well-appearing [No Respiratory Distress] : no respiratory distress  [No Accessory Muscle Use] : no accessory muscle use [Clear to Auscultation] : lungs were clear to auscultation bilaterally [Normal Rate] : normal rate  [Regular Rhythm] : with a regular rhythm [Normal S1, S2] : normal S1 and S2 [No Murmur] : no murmur heard [No Edema] : there was no peripheral edema [Speech Grossly Normal] : speech grossly normal [Memory Grossly Normal] : memory grossly normal [Normal Affect] : the affect was normal [Alert and Oriented x3] : oriented to person, place, and time [Normal Mood] : the mood was normal [Normal Insight/Judgement] : insight and judgment were intact [de-identified] : Patient wearing protective face mask

## 2022-01-10 NOTE — COUNSELING
[None] : None [Good understanding] : Patient has a good understanding of lifestyle changes and steps needed to achieve self management goal [Use of nicotine replacement therapies and other medications discussed] : Use of nicotine replacement therapies and other medications discussed [Encouraged to pick a quit date and identify support needed to quit] : Encouraged to pick a quit date and identify support needed to quit [Smoking Cessation Program Referral] : Smoking Cessation Program Referral  [Yes] : Willing to quit smoking [FreeTextEntry1] : 5

## 2022-01-10 NOTE — PLAN
[FreeTextEntry1] : - Patient was advised to take his antihypertensive medications in the morning, it is likely that his blood pressure will be improved with this switch\par - Switch simvastatin to atorvastatin. \par - Continue with all other treatments\par - Home machine validated

## 2022-07-14 ENCOUNTER — RX RENEWAL (OUTPATIENT)
Age: 72
End: 2022-07-14

## 2022-09-13 ENCOUNTER — NON-APPOINTMENT (OUTPATIENT)
Age: 72
End: 2022-09-13

## 2022-09-13 ENCOUNTER — RESULT CHARGE (OUTPATIENT)
Age: 72
End: 2022-09-13

## 2022-09-13 ENCOUNTER — APPOINTMENT (OUTPATIENT)
Dept: INTERNAL MEDICINE | Facility: CLINIC | Age: 72
End: 2022-09-13

## 2022-09-13 VITALS
BODY MASS INDEX: 26.66 KG/M2 | DIASTOLIC BLOOD PRESSURE: 60 MMHG | TEMPERATURE: 97.5 F | OXYGEN SATURATION: 96 % | SYSTOLIC BLOOD PRESSURE: 145 MMHG | HEIGHT: 69 IN | WEIGHT: 180 LBS | HEART RATE: 48 BPM

## 2022-09-13 DIAGNOSIS — D72.829 ELEVATED WHITE BLOOD CELL COUNT, UNSPECIFIED: ICD-10-CM

## 2022-09-13 DIAGNOSIS — R73.9 HYPERGLYCEMIA, UNSPECIFIED: ICD-10-CM

## 2022-09-13 DIAGNOSIS — E55.9 VITAMIN D DEFICIENCY, UNSPECIFIED: ICD-10-CM

## 2022-09-13 DIAGNOSIS — I25.10 ATHEROSCLEROTIC HEART DISEASE OF NATIVE CORONARY ARTERY W/OUT ANGINA PECTORIS: ICD-10-CM

## 2022-09-13 DIAGNOSIS — N13.8 BENIGN PROSTATIC HYPERPLASIA WITH LOWER URINARY TRACT SYMPMS: ICD-10-CM

## 2022-09-13 DIAGNOSIS — R09.89 OTHER SPECIFIED SYMPTOMS AND SIGNS INVOLVING THE CIRCULATORY AND RESPIRATORY SYSTEMS: ICD-10-CM

## 2022-09-13 DIAGNOSIS — N40.1 BENIGN PROSTATIC HYPERPLASIA WITH LOWER URINARY TRACT SYMPMS: ICD-10-CM

## 2022-09-13 DIAGNOSIS — Z23 ENCOUNTER FOR IMMUNIZATION: ICD-10-CM

## 2022-09-13 DIAGNOSIS — Z11.59 ENCOUNTER FOR SCREENING FOR OTHER VIRAL DISEASES: ICD-10-CM

## 2022-09-13 DIAGNOSIS — Z12.2 ENCOUNTER FOR SCREENING FOR MALIGNANT NEOPLASM OF RESPIRATORY ORGANS: ICD-10-CM

## 2022-09-13 DIAGNOSIS — R00.2 PALPITATIONS: ICD-10-CM

## 2022-09-13 PROCEDURE — G0439: CPT

## 2022-09-13 PROCEDURE — 90662 IIV NO PRSV INCREASED AG IM: CPT

## 2022-09-13 PROCEDURE — 99406 BEHAV CHNG SMOKING 3-10 MIN: CPT

## 2022-09-13 PROCEDURE — G0008: CPT

## 2022-09-13 PROCEDURE — G0296 VISIT TO DETERM LDCT ELIG: CPT

## 2022-09-13 PROCEDURE — G0009: CPT

## 2022-09-13 PROCEDURE — 81003 URINALYSIS AUTO W/O SCOPE: CPT | Mod: QW

## 2022-09-13 PROCEDURE — 90677 PCV20 VACCINE IM: CPT

## 2022-09-13 PROCEDURE — G0444 DEPRESSION SCREEN ANNUAL: CPT | Mod: 59

## 2022-09-13 RX ORDER — BUPROPION HYDROCHLORIDE 150 MG/1
150 TABLET, EXTENDED RELEASE ORAL
Qty: 60 | Refills: 2 | Status: DISCONTINUED | COMMUNITY
Start: 2022-01-10 | End: 2022-09-13

## 2022-09-13 NOTE — HEALTH RISK ASSESSMENT
[Good] : ~his/her~  mood as  good [Current] : Current [20 or more] : 20 or more [No] : No [No falls in past year] : Patient reported no falls in the past year [0] : 2) Feeling down, depressed, or hopeless: Not at all (0) [PHQ-2 Negative - No further assessment needed] : PHQ-2 Negative - No further assessment needed [Patient reported colonoscopy was normal] : Patient reported colonoscopy was normal [HIV test declined] : HIV test declined [None] : None [With Significant Other] : lives with significant other [Retired] : retired [] :  [# Of Children ___] : has [unfilled] children [Sexually Active] : sexually active [Feels Safe at Home] : Feels safe at home [Fully functional (bathing, dressing, toileting, transferring, walking, feeding)] : Fully functional (bathing, dressing, toileting, transferring, walking, feeding) [Fully functional (using the telephone, shopping, preparing meals, housekeeping, doing laundry, using] : Fully functional and needs no help or supervision to perform IADLs (using the telephone, shopping, preparing meals, housekeeping, doing laundry, using transportation, managing medications and managing finances) [Smoke Detector] : smoke detector [Carbon Monoxide Detector] : carbon monoxide detector [Seat Belt] :  uses seat belt [Sunscreen] : uses sunscreen [[id number]] : 0 [Change in mental status noted] : No change in mental status noted [Reports changes in hearing] : Reports no changes in hearing [Reports changes in vision] : Reports no changes in vision [Reports changes in dental health] : Reports no changes in dental health [ColonoscopyDate] : 2016 [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

## 2022-09-13 NOTE — COUNSELING
[Yes] : Risk of tobacco use and health benefits of smoking cessation discussed: Yes [No] : Not willing to quit smoking [ - Annual Lung Cancer Screening/Share Decision Making Discussion] : Annual Lung Cancer Screening/Share Decision Making Discussion. (I have advised this patient to have a Low Dose CT (LDCT) scan of the lungs and have discussed the following with the patient in a shared decision making discussion:   Benefits of Detection and Early Treatment: There is adequate evidence that annual screening for lung cancer with LDCT in a population of high-risk persons can prevent a substantial number of lung cancer–related deaths. The magnitude of benefit depends on the individual patient's risk for lung cancer, as those who are at highest risk are most likely to benefit. Screening cannot prevent most lung cancer–related deaths, and does not replace smoking cessation. Harms of Detection and Early Intervention and Treatment: The harms associated with LDCT screening include false-negative and false-positive results, incidental findings, over diagnosis, and radiation exposure. False-positive LDCT results occur in a substantial proportion of screened persons; 95% of all positive results do not lead to a diagnosis of cancer. In a high-quality screening program, further imaging can resolve most false-positive results; however, some patients may require invasive procedures. Radiation harms, including cancer resulting from cumulative exposure to radiation, vary depending on the age at the start of screening; the number of scans received; and the person's exposure to other sources of radiation, particularly other medical imaging.) [FreeTextEntry1] : 5

## 2022-09-13 NOTE — HISTORY OF PRESENT ILLNESS
[FreeTextEntry1] : Here to f/u medical conditions\par HTN\par COPD\par  c/o fatigue - legs hurting him - claudication.

## 2022-09-19 ENCOUNTER — APPOINTMENT (OUTPATIENT)
Dept: THORACIC SURGERY | Facility: CLINIC | Age: 72
End: 2022-09-19

## 2022-09-19 ENCOUNTER — NON-APPOINTMENT (OUTPATIENT)
Age: 72
End: 2022-09-19

## 2022-09-19 VITALS — BODY MASS INDEX: 27.25 KG/M2 | WEIGHT: 184 LBS | HEIGHT: 69 IN

## 2022-09-19 DIAGNOSIS — J44.9 CHRONIC OBSTRUCTIVE PULMONARY DISEASE, UNSPECIFIED: ICD-10-CM

## 2022-09-19 PROCEDURE — G0296 VISIT TO DETERM LDCT ELIG: CPT

## 2022-09-21 ENCOUNTER — APPOINTMENT (OUTPATIENT)
Dept: ULTRASOUND IMAGING | Facility: CLINIC | Age: 72
End: 2022-09-21

## 2022-09-21 ENCOUNTER — OUTPATIENT (OUTPATIENT)
Dept: OUTPATIENT SERVICES | Facility: HOSPITAL | Age: 72
LOS: 1 days | End: 2022-09-21
Payer: MEDICARE

## 2022-09-21 ENCOUNTER — RESULT REVIEW (OUTPATIENT)
Age: 72
End: 2022-09-21

## 2022-09-21 ENCOUNTER — APPOINTMENT (OUTPATIENT)
Dept: CT IMAGING | Facility: CLINIC | Age: 72
End: 2022-09-21

## 2022-09-21 DIAGNOSIS — Z00.00 ENCOUNTER FOR GENERAL ADULT MEDICAL EXAMINATION WITHOUT ABNORMAL FINDINGS: ICD-10-CM

## 2022-09-21 PROCEDURE — 76700 US EXAM ABDOM COMPLETE: CPT

## 2022-09-21 PROCEDURE — 71271 CT THORAX LUNG CANCER SCR C-: CPT | Mod: 26

## 2022-09-21 PROCEDURE — 76700 US EXAM ABDOM COMPLETE: CPT | Mod: 26

## 2022-09-21 PROCEDURE — 71271 CT THORAX LUNG CANCER SCR C-: CPT

## 2022-09-21 NOTE — PLAN
[FreeTextEntry1] : Plan:\par \par -Low Dose CT chest for lung cancer screening scheduled for 9/21/22 at [place]\par \par -Patient to follow up with Dr. Paz to review results of LDCT\par \par -Encouraged smoking cessation\par \par -Referral to CTC\par \par Engaged in shared decision making with Mr. CLAUDETTE REYNOSO . Answered all questions. He verbalized understanding and agreement. He knows to call back with any questions or concerns\par

## 2022-09-21 NOTE — HISTORY OF PRESENT ILLNESS
[TextBox_13] : Referred by Dr. Paz\par \par Mr. CLAUDETTE REYNOSO is a 72 year old man with a history of nicotine dependence\par \par  Over the telephone today we reviewed and confirmed that the patient meets screening eligibility criteria:\par \par -Age: 72 years old\par \par Smoking status:\par \par -Current smoker\par \par -Number of pack(s) per day: 1\par \par -Number of years smoked:58\par \par -Number of pack years smokin\par \par \par \par \par Mr. REYNOSO denies any personal history of lung cancer. Denies any s/s of lung cancer. No lung cancer in a 1st degree relative. History of COPD. Denies any history of occupational exposures\par   [TextBox_6] : 1 [TextBox_8] : 58

## 2022-09-22 LAB
25(OH)D3 SERPL-MCNC: 35.7 NG/ML
ALBUMIN SERPL ELPH-MCNC: 4.3 G/DL
ALP BLD-CCNC: 117 U/L
ALT SERPL-CCNC: 19 U/L
ANION GAP SERPL CALC-SCNC: 14 MMOL/L
AST SERPL-CCNC: 13 U/L
BASOPHILS # BLD AUTO: 0.07 K/UL
BASOPHILS NFR BLD AUTO: 0.7 %
BILIRUB SERPL-MCNC: 0.4 MG/DL
BUN SERPL-MCNC: 21 MG/DL
CALCIUM SERPL-MCNC: 10.2 MG/DL
CHLORIDE SERPL-SCNC: 102 MMOL/L
CHOLEST SERPL-MCNC: 169 MG/DL
CK SERPL-CCNC: 67 U/L
CO2 SERPL-SCNC: 25 MMOL/L
COVID-19 NUCLEOCAPSID  GAM ANTIBODY INTERPRETATION: NEGATIVE
COVID-19 SPIKE DOMAIN ANTIBODY INTERPRETATION: POSITIVE
CREAT SERPL-MCNC: 1.31 MG/DL
EGFR: 57 ML/MIN/1.73M2
EOSINOPHIL # BLD AUTO: 0.18 K/UL
EOSINOPHIL NFR BLD AUTO: 1.8 %
ESTIMATED AVERAGE GLUCOSE: 148 MG/DL
GLUCOSE SERPL-MCNC: 90 MG/DL
HBA1C MFR BLD HPLC: 6.8 %
HCT VFR BLD CALC: 44.8 %
HDLC SERPL-MCNC: 40 MG/DL
HGB BLD-MCNC: 14.3 G/DL
IMM GRANULOCYTES NFR BLD AUTO: 0.2 %
LDLC SERPL CALC-MCNC: 84 MG/DL
LYMPHOCYTES # BLD AUTO: 2.73 K/UL
LYMPHOCYTES NFR BLD AUTO: 26.8 %
MAN DIFF?: NORMAL
MCHC RBC-ENTMCNC: 30.6 PG
MCHC RBC-ENTMCNC: 31.9 GM/DL
MCV RBC AUTO: 95.9 FL
MONOCYTES # BLD AUTO: 0.72 K/UL
MONOCYTES NFR BLD AUTO: 7.1 %
NEUTROPHILS # BLD AUTO: 6.47 K/UL
NEUTROPHILS NFR BLD AUTO: 63.4 %
NONHDLC SERPL-MCNC: 130 MG/DL
PLATELET # BLD AUTO: 264 K/UL
POTASSIUM SERPL-SCNC: 4.7 MMOL/L
PROT SERPL-MCNC: 6.5 G/DL
RBC # BLD: 4.67 M/UL
RBC # FLD: 14.6 %
SARS-COV-2 AB SERPL IA-ACNC: >250 U/ML
SARS-COV-2 AB SERPL QL IA: 0.08 INDEX
SODIUM SERPL-SCNC: 141 MMOL/L
T4 FREE SERPL-MCNC: 1 NG/DL
T4 SERPL-MCNC: 7.9 UG/DL
TRIGL SERPL-MCNC: 231 MG/DL
TSH SERPL-ACNC: 1.39 UIU/ML
VIT B12 SERPL-MCNC: 429 PG/ML
WBC # FLD AUTO: 10.19 K/UL

## 2022-09-27 ENCOUNTER — APPOINTMENT (OUTPATIENT)
Dept: INTERNAL MEDICINE | Facility: CLINIC | Age: 72
End: 2022-09-27

## 2022-09-27 PROCEDURE — 93922 UPR/L XTREMITY ART 2 LEVELS: CPT

## 2022-09-27 PROCEDURE — 93880 EXTRACRANIAL BILAT STUDY: CPT

## 2022-10-07 ENCOUNTER — NON-APPOINTMENT (OUTPATIENT)
Age: 72
End: 2022-10-07

## 2022-11-29 ENCOUNTER — APPOINTMENT (OUTPATIENT)
Dept: INTERNAL MEDICINE | Facility: CLINIC | Age: 72
End: 2022-11-29

## 2022-11-29 ENCOUNTER — NON-APPOINTMENT (OUTPATIENT)
Age: 72
End: 2022-11-29

## 2022-11-29 VITALS
WEIGHT: 185 LBS | BODY MASS INDEX: 27.4 KG/M2 | HEART RATE: 54 BPM | OXYGEN SATURATION: 97 % | SYSTOLIC BLOOD PRESSURE: 173 MMHG | DIASTOLIC BLOOD PRESSURE: 67 MMHG | HEIGHT: 69 IN | TEMPERATURE: 97.8 F

## 2022-11-29 VITALS — SYSTOLIC BLOOD PRESSURE: 138 MMHG | DIASTOLIC BLOOD PRESSURE: 78 MMHG

## 2022-11-29 PROCEDURE — 93000 ELECTROCARDIOGRAM COMPLETE: CPT

## 2022-11-29 PROCEDURE — 99213 OFFICE O/P EST LOW 20 MIN: CPT | Mod: 25

## 2022-11-29 NOTE — HISTORY OF PRESENT ILLNESS
[FreeTextEntry1] : follow up htn  he is on 3 meds.  had  lung ct cancer screening  which was stable and he says he know he needs it yearly.  he has no headaches no dizziness  and  no cardiopulm sx.

## 2022-11-29 NOTE — PHYSICAL EXAM
[No Acute Distress] : no acute distress [Normal Sclera/Conjunctiva] : normal sclera/conjunctiva [Normal Rate] : normal rate  [Normal S1, S2] : normal S1 and S2 [No Edema] : there was no peripheral edema [Coordination Grossly Intact] : coordination grossly intact [Normal Gait] : normal gait [Normal] : affect was normal and insight and judgment were intact

## 2023-03-13 ENCOUNTER — APPOINTMENT (OUTPATIENT)
Dept: PAIN MANAGEMENT | Facility: CLINIC | Age: 73
End: 2023-03-13
Payer: MEDICARE

## 2023-03-13 VITALS — BODY MASS INDEX: 27.4 KG/M2 | WEIGHT: 185 LBS | HEIGHT: 69 IN

## 2023-03-13 PROCEDURE — 99214 OFFICE O/P EST MOD 30 MIN: CPT | Mod: 25

## 2023-03-13 PROCEDURE — J3490M: CUSTOM

## 2023-03-13 PROCEDURE — 20552 NJX 1/MLT TRIGGER POINT 1/2: CPT

## 2023-03-13 NOTE — ASSESSMENT
[FreeTextEntry1] : c/o pain in lower back, paresthesias in legs - difficulty with stairs, pain radiates into bl leg\par \par \par prior RAY some with relief\par \par reports 5-6 mo relief with tpi\par \par

## 2023-03-13 NOTE — PHYSICAL EXAM
[de-identified] : PHYSICAL EXAM\par \par Constitutional: \par Appears well, no apparent distress\par Ability to communicate: Normal\par Respiratory: non-labored breathing\par Skin: no rash noted\par Head: normocephalic, atraumatic\par Neck: no visible thyroid enlargement\par Eyes: extraocular movements intact\par Neurologic: alert and oriented x3\par Psychiatric: normal mood, affect, and behavior\par \par Lumbar Spine: \par Palpation: left and right lumbar paraspinal spasm and left and right lumbar paraspinal tenderness to palpation.\par ROM: Diminished range of motion in all plains.  Patient notes pain with lateral bending to the left and right.\par MMT: Motor exam is 5/5 through out bilateral lower extremities.\par Sensation: Light touch and pain is intact throughout bilateral lower extremities.\par Reflexes: achilles and patella reflexes are intact and  symmetrical.  No sustained clonus.\par Special Testing: Positive straight leg raise on the left and right side.\par \par Assessemnt:\par Radiculopathy of lumbosacral region (M54.17)\par Myalgia (M79.10)\par \par \par

## 2023-03-13 NOTE — HISTORY OF PRESENT ILLNESS
[Lower back] : lower back [10] : 10 [Radiating] : radiating [Tingling] : tingling [Constant] : constant [Sitting] : sitting [Standing] : standing [Walking] : walking [Bending forward] : bending forward [FreeTextEntry1] : 3/13/23: pt is following up for lower back pain , the pain is going into both legs he states he feels his legs numb all the time  [] : no [FreeTextEntry6] : numbness [FreeTextEntry9] : tylenol

## 2023-03-15 ENCOUNTER — FORM ENCOUNTER (OUTPATIENT)
Age: 73
End: 2023-03-15

## 2023-03-16 ENCOUNTER — APPOINTMENT (OUTPATIENT)
Dept: MRI IMAGING | Facility: CLINIC | Age: 73
End: 2023-03-16
Payer: MEDICARE

## 2023-03-16 PROCEDURE — 72148 MRI LUMBAR SPINE W/O DYE: CPT

## 2023-03-21 ENCOUNTER — NON-APPOINTMENT (OUTPATIENT)
Age: 73
End: 2023-03-21

## 2023-03-22 ENCOUNTER — APPOINTMENT (OUTPATIENT)
Dept: INTERNAL MEDICINE | Facility: CLINIC | Age: 73
End: 2023-03-22
Payer: MEDICARE

## 2023-03-22 ENCOUNTER — NON-APPOINTMENT (OUTPATIENT)
Age: 73
End: 2023-03-22

## 2023-03-22 VITALS
DIASTOLIC BLOOD PRESSURE: 70 MMHG | WEIGHT: 191 LBS | HEART RATE: 74 BPM | OXYGEN SATURATION: 97 % | SYSTOLIC BLOOD PRESSURE: 143 MMHG | HEIGHT: 69 IN | BODY MASS INDEX: 28.29 KG/M2 | TEMPERATURE: 97.7 F

## 2023-03-22 DIAGNOSIS — E11.9 TYPE 2 DIABETES MELLITUS W/OUT COMPLICATIONS: ICD-10-CM

## 2023-03-22 DIAGNOSIS — R29.898 OTHER SYMPTOMS AND SIGNS INVOLVING THE MUSCULOSKELETAL SYSTEM: ICD-10-CM

## 2023-03-22 DIAGNOSIS — E07.9 DISORDER OF THYROID, UNSPECIFIED: ICD-10-CM

## 2023-03-22 DIAGNOSIS — I71.43 INFRARENAL ABDOMINAL AORTIC ANEURYSM, WITHOUT RUPTURE: ICD-10-CM

## 2023-03-22 DIAGNOSIS — E53.8 DEFICIENCY OF OTHER SPECIFIED B GROUP VITAMINS: ICD-10-CM

## 2023-03-22 DIAGNOSIS — R53.83 OTHER FATIGUE: ICD-10-CM

## 2023-03-22 DIAGNOSIS — G47.19 OTHER HYPERSOMNIA: ICD-10-CM

## 2023-03-22 DIAGNOSIS — G47.9 OTHER HYPERSOMNIA: ICD-10-CM

## 2023-03-22 PROCEDURE — 36415 COLL VENOUS BLD VENIPUNCTURE: CPT

## 2023-03-22 PROCEDURE — 99214 OFFICE O/P EST MOD 30 MIN: CPT | Mod: 25

## 2023-03-22 PROCEDURE — 93000 ELECTROCARDIOGRAM COMPLETE: CPT

## 2023-03-22 NOTE — HISTORY OF PRESENT ILLNESS
[FreeTextEntry8] : Patient was seen by pain management MD and MRI of the lower back done- found to have incidental Abdominal aortic aneurism- 3.8 cm and referred to see Primary care physician( was not available today) , so seen by me\par  As per patient LAtely he feels more tired and sleepy\par Also c/o snoring and feeling very tired and sleepy at day time

## 2023-03-23 LAB
BASOPHILS # BLD AUTO: 0.08 K/UL
BASOPHILS NFR BLD AUTO: 0.8 %
EOSINOPHIL # BLD AUTO: 0.11 K/UL
EOSINOPHIL NFR BLD AUTO: 1 %
FOLATE SERPL-MCNC: 12.3 NG/ML
HCT VFR BLD CALC: 45.8 %
HGB BLD-MCNC: 15.1 G/DL
IMM GRANULOCYTES NFR BLD AUTO: 0.4 %
IRON SERPL-MCNC: 97 UG/DL
LYMPHOCYTES # BLD AUTO: 2.66 K/UL
LYMPHOCYTES NFR BLD AUTO: 25.1 %
MAN DIFF?: NORMAL
MCHC RBC-ENTMCNC: 30.9 PG
MCHC RBC-ENTMCNC: 33 GM/DL
MCV RBC AUTO: 93.7 FL
MONOCYTES # BLD AUTO: 0.64 K/UL
MONOCYTES NFR BLD AUTO: 6 %
NEUTROPHILS # BLD AUTO: 7.06 K/UL
NEUTROPHILS NFR BLD AUTO: 66.7 %
PLATELET # BLD AUTO: 334 K/UL
RBC # BLD: 4.89 M/UL
RBC # FLD: 14.6 %
T3FREE SERPL-MCNC: 2.27 PG/ML
T4 FREE SERPL-MCNC: 1.2 NG/DL
TSH SERPL-ACNC: 1.13 UIU/ML
VIT B12 SERPL-MCNC: 520 PG/ML
WBC # FLD AUTO: 10.59 K/UL

## 2023-03-23 RX ORDER — ASPIRIN 81 MG/1
81 TABLET ORAL DAILY
Refills: 0 | Status: DISCONTINUED | COMMUNITY
End: 2023-03-23

## 2023-03-27 ENCOUNTER — APPOINTMENT (OUTPATIENT)
Dept: VASCULAR SURGERY | Facility: CLINIC | Age: 73
End: 2023-03-27
Payer: MEDICARE

## 2023-03-27 VITALS
HEART RATE: 48 BPM | WEIGHT: 190 LBS | HEIGHT: 69 IN | SYSTOLIC BLOOD PRESSURE: 154 MMHG | BODY MASS INDEX: 28.14 KG/M2 | OXYGEN SATURATION: 96 % | DIASTOLIC BLOOD PRESSURE: 72 MMHG | TEMPERATURE: 97.8 F

## 2023-03-27 LAB
ESTIMATED AVERAGE GLUCOSE: 160 MG/DL
HBA1C MFR BLD HPLC: 7.2 %

## 2023-03-27 PROCEDURE — 99203 OFFICE O/P NEW LOW 30 MIN: CPT

## 2023-03-27 RX ORDER — TAMSULOSIN HYDROCHLORIDE 0.4 MG/1
0.4 CAPSULE ORAL
Qty: 90 | Refills: 1 | Status: DISCONTINUED | COMMUNITY
Start: 2022-09-13 | End: 2023-03-27

## 2023-03-27 RX ORDER — ADHESIVE TAPE 3"X 2.3 YD
50 MCG TAPE, NON-MEDICATED TOPICAL
Refills: 0 | Status: DISCONTINUED | COMMUNITY
End: 2023-03-27

## 2023-03-27 NOTE — PHYSICAL EXAM
[2+] : left 2+ [0] : left 0 [Ankle Swelling (On Exam)] : not present [Calm] : calm [de-identified] : Well-appearing  [de-identified] : EOMI, anicteric  [de-identified] : soft, nt, nd  [de-identified] : motor and sensation intact in all four extremities  [de-identified] : no wounds on bilateral feet [de-identified] : A&Ox4

## 2023-03-27 NOTE — CONSULT LETTER
[Dear  ___] : Dear  [unfilled], [Consult Letter:] : I had the pleasure of evaluating your patient, [unfilled]. [Please see my note below.] : Please see my note below. [Consult Closing:] : Thank you very much for allowing me to participate in the care of this patient.  If you have any questions, please do not hesitate to contact me. [Sincerely,] : Sincerely, [FreeTextEntry1] : CLAUDETTE REYNOSO is a 73 year old male presents for evaluation of AAA and PAD. [FreeTextEntry3] : \par \par \par \par Silvana Riddle MD, FACS, RPVI\par Division of Vascular & Endovascular Surgery\par [facility], Department of Surgery

## 2023-03-27 NOTE — HISTORY OF PRESENT ILLNESS
[FreeTextEntry1] : CLAUDETTE REYNOSO is a 73 year old male who presents for evaluation of AAA. \par \par Referred by Dr. Ivania Baer (was unable to see PCP at that visit). \par \par Patient states that he has chronic back problems causing leg pain. He follows w/ orthopedic surgeon for his back and recently underwent an MRI. The MRI was significant for multilevel degenerative disc disease. Of note, the MRI also noted an aneurysm that was incompletely imaged. Denies history of abdominal pain. For his back pain, he has gotten injections which has helped with the pain. He also reports a history of intermittent claudication but denies any rest pain or wounds. Patient is a current smoker - smoking 1-2 packs per day for > 50 years. Denies family history of aneurysm\par \par + PMH: HTN, HLD, atrial fibrillation\par + PSH: left shoulder surgery\par + FH: denies\par + SH: current smoker, no etoh use\par + Aller: PCN\par \par PCP is Dr. Alden Paz. \par

## 2023-03-27 NOTE — ASSESSMENT
[FreeTextEntry1] : CLAUDETTE REYNOSO is a 73 year old male presents for evaluation. \par \par > AAA\par - Last ultrasound shows aneurysm measuring 4.4 x 2.9 cm. \par - Will obtain CTA abdomen/pelvis for evaluation. \par - Discussed w/ patient importance of smoking cessation. Gave patient [facility] smoking cessation info packet. \par - Also discussed w/ patient that there is genetic component to AAA. Advised any first degree relatives > 65 years old to be screened for aneurysm. \par \par > PAD\par - Last ZOHRA from 2020 consistent with PAD. \par - Will repeat ZOHRA/PVRs. \par - Medical management of PAD. Patient to start aspirin 81 mg every other day - he reports that he stopped taking aspirin 81 mg due to increased bruising. Continue statin and antihypertensives. \par - Smoking cessation. \par - Advised exercise regimen - ambulating 30 minutes three times per week.

## 2023-03-30 ENCOUNTER — APPOINTMENT (OUTPATIENT)
Dept: CARDIOLOGY | Facility: CLINIC | Age: 73
End: 2023-03-30
Payer: MEDICARE

## 2023-03-30 VITALS — DIASTOLIC BLOOD PRESSURE: 60 MMHG | SYSTOLIC BLOOD PRESSURE: 156 MMHG

## 2023-03-30 VITALS
OXYGEN SATURATION: 96 % | HEIGHT: 69 IN | HEART RATE: 46 BPM | TEMPERATURE: 97.8 F | DIASTOLIC BLOOD PRESSURE: 72 MMHG | SYSTOLIC BLOOD PRESSURE: 159 MMHG

## 2023-03-30 DIAGNOSIS — I65.23 OCCLUSION AND STENOSIS OF BILATERAL CAROTID ARTERIES: ICD-10-CM

## 2023-03-30 PROCEDURE — 99214 OFFICE O/P EST MOD 30 MIN: CPT

## 2023-03-30 NOTE — HISTORY OF PRESENT ILLNESS
[FreeTextEntry1] : 73M with HTN, HLD, carotid stenosis, AAA, current smoker who presents for follow up\par \par Last seen Sept 2021\par ECG on 3/22- noted to be in new onset AFib\par Recommended to start Eliquis, pt declined due to cost\par \par Ongoing vascular issues\par Had ZOHRA which was notable for moderate bilateral PAD\par Carotid duplex which showed moderate L ICA disease, Mild R sided ICA disease\par AAA last measured 4.4 cm, following with vascular surgery Dr. Riddle. Pending CTA\par Pt otherwise feels well without complaints\par Denies CP, dyspnea, lightheadedness\par His wife tells him he breathes heavy\par Continued claudication symptoms- but also notes an improvement since a cortisone shot to his back\par Metoprolol in past- stopped when he was noted to be bradycardic during a spinal injection \par \par Current smoker. Drives a limo. Denies family hx of CAD\par \par ECG: AFib at 91\par \par Atorvastatin 40mg \par \par Amlodipine 10mg\par Olmesartan 40mg

## 2023-03-30 NOTE — DISCUSSION/SUMMARY
[FreeTextEntry1] : Afib: New onset on last ECG, back in SR (based on exam) today\par \par Elevated [id number], would encourage Eliquis 5mg BID- pt will try\par Avoid AVN blockers given baseline bradycardia\par EP eval\par TTE pending next week\par \par HTN: Running high today, cont amlodipine, olmesartan\par \par GINNY: Cont statin, now will start Eliquis. Vascular follow up\par \par AAA: BP control, vascular eval ongoing, pending CTA\par \par PAD: Supervised exercise, Eliquis, statin, vascular eval ongoing\par \par Smoking cessation encouraged \par \par RV 3M

## 2023-04-03 ENCOUNTER — APPOINTMENT (OUTPATIENT)
Dept: INTERNAL MEDICINE | Facility: CLINIC | Age: 73
End: 2023-04-03
Payer: MEDICARE

## 2023-04-03 PROCEDURE — 93306 TTE W/DOPPLER COMPLETE: CPT

## 2023-04-05 ENCOUNTER — APPOINTMENT (OUTPATIENT)
Dept: PAIN MANAGEMENT | Facility: CLINIC | Age: 73
End: 2023-04-05
Payer: MEDICARE

## 2023-04-05 VITALS — BODY MASS INDEX: 28.14 KG/M2 | HEIGHT: 69 IN | WEIGHT: 190 LBS

## 2023-04-05 PROCEDURE — 20552 NJX 1/MLT TRIGGER POINT 1/2: CPT

## 2023-04-05 PROCEDURE — J3490M: CUSTOM

## 2023-04-05 PROCEDURE — 99214 OFFICE O/P EST MOD 30 MIN: CPT | Mod: 25

## 2023-04-05 NOTE — HISTORY OF PRESENT ILLNESS
[Lower back] : lower back [10] : 10 [9] : 9 [Radiating] : radiating [Constant] : constant [Standing] : standing [Walking] : walking [FreeTextEntry1] : pt is following up for lower back mri results  [] : no [FreeTextEntry7] : b/l legs [FreeTextEntry9] : tylenol

## 2023-04-05 NOTE — DISCUSSION/SUMMARY
[de-identified] : I personally reviewed the MRI/CT scan images and agree with the radiologist's report. The radiological findings were discussed with the patient\par \par tpi with 70% relie fof pain and adls\par will repeat tpi\par continiue HEP

## 2023-04-05 NOTE — PHYSICAL EXAM
[de-identified] : PHYSICAL EXAM\par \par Constitutional: \par Appears well, no apparent distress\par Ability to communicate: Normal\par Respiratory: non-labored breathing\par Skin: no rash noted\par Head: normocephalic, atraumatic\par Neck: no visible thyroid enlargement\par Eyes: extraocular movements intact\par Neurologic: alert and oriented x3\par Psychiatric: normal mood, affect, and behavior\par \par Lumbar Spine: \par Palpation: left and right lumbar paraspinal spasm and left and right lumbar paraspinal tenderness to palpation.\par ROM: Diminished range of motion in all plains.  Patient notes pain with lateral bending to the left and right.\par MMT: Motor exam is 5/5 through out bilateral lower extremities.\par Sensation: Light touch and pain is intact throughout bilateral lower extremities.\par Reflexes: achilles and patella reflexes are intact and  symmetrical.  No sustained clonus.\par Special Testing: Positive straight leg raise on the left and right side.\par \par

## 2023-05-11 ENCOUNTER — RESULT REVIEW (OUTPATIENT)
Age: 73
End: 2023-05-11

## 2023-05-11 ENCOUNTER — APPOINTMENT (OUTPATIENT)
Dept: CT IMAGING | Facility: HOSPITAL | Age: 73
End: 2023-05-11

## 2023-05-11 ENCOUNTER — OUTPATIENT (OUTPATIENT)
Dept: OUTPATIENT SERVICES | Facility: HOSPITAL | Age: 73
LOS: 1 days | Discharge: HOME | End: 2023-05-11
Payer: MEDICARE

## 2023-05-11 DIAGNOSIS — I71.40 ABDOMINAL AORTIC ANEURYSM, WITHOUT RUPTURE, UNSPECIFIED: ICD-10-CM

## 2023-05-11 LAB
ANION GAP SERPL CALC-SCNC: -1 MMOL/L — LOW (ref 5–17)
BUN SERPL-MCNC: 17 MG/DL — SIGNIFICANT CHANGE UP (ref 7–23)
CALCIUM SERPL-MCNC: 9.3 MG/DL — SIGNIFICANT CHANGE UP (ref 8.5–10.1)
CHLORIDE SERPL-SCNC: 109 MMOL/L — HIGH (ref 96–108)
CO2 SERPL-SCNC: 29 MMOL/L — SIGNIFICANT CHANGE UP (ref 22–31)
CREAT SERPL-MCNC: 1.37 MG/DL — HIGH (ref 0.5–1.3)
EGFR: 54 ML/MIN/1.73M2 — LOW
GLUCOSE SERPL-MCNC: 138 MG/DL — HIGH (ref 70–99)
POTASSIUM SERPL-MCNC: 4.3 MMOL/L — SIGNIFICANT CHANGE UP (ref 3.5–5.3)
POTASSIUM SERPL-SCNC: 4.3 MMOL/L — SIGNIFICANT CHANGE UP (ref 3.5–5.3)
SODIUM SERPL-SCNC: 137 MMOL/L — SIGNIFICANT CHANGE UP (ref 135–145)

## 2023-05-11 PROCEDURE — 74174 CTA ABD&PLVS W/CONTRAST: CPT | Mod: 26

## 2023-05-18 ENCOUNTER — NON-APPOINTMENT (OUTPATIENT)
Age: 73
End: 2023-05-18

## 2023-06-29 ENCOUNTER — APPOINTMENT (OUTPATIENT)
Dept: CARDIOLOGY | Facility: CLINIC | Age: 73
End: 2023-06-29

## 2023-07-05 ENCOUNTER — APPOINTMENT (OUTPATIENT)
Dept: ULTRASOUND IMAGING | Facility: HOSPITAL | Age: 73
End: 2023-07-05

## 2023-07-05 ENCOUNTER — OUTPATIENT (OUTPATIENT)
Dept: OUTPATIENT SERVICES | Facility: HOSPITAL | Age: 73
LOS: 1 days | Discharge: HOME | End: 2023-07-05
Payer: MEDICARE

## 2023-07-05 DIAGNOSIS — I73.9 PERIPHERAL VASCULAR DISEASE, UNSPECIFIED: ICD-10-CM

## 2023-07-05 PROCEDURE — 93923 UPR/LXTR ART STDY 3+ LVLS: CPT | Mod: 26

## 2023-07-07 ENCOUNTER — RX RENEWAL (OUTPATIENT)
Age: 73
End: 2023-07-07

## 2023-07-07 ENCOUNTER — NON-APPOINTMENT (OUTPATIENT)
Age: 73
End: 2023-07-07

## 2023-07-12 ENCOUNTER — APPOINTMENT (OUTPATIENT)
Dept: VASCULAR SURGERY | Facility: CLINIC | Age: 73
End: 2023-07-12
Payer: MEDICARE

## 2023-07-12 VITALS
BODY MASS INDEX: 28.29 KG/M2 | TEMPERATURE: 97.9 F | HEIGHT: 69 IN | OXYGEN SATURATION: 97 % | WEIGHT: 191 LBS | HEART RATE: 53 BPM | DIASTOLIC BLOOD PRESSURE: 62 MMHG | SYSTOLIC BLOOD PRESSURE: 132 MMHG

## 2023-07-12 DIAGNOSIS — I70.219 ATHEROSCLEROSIS OF NATIVE ARTERIES OF EXTREMITIES WITH INTERMITTENT CLAUDICATION, UNSPECIFIED EXTREMITY: ICD-10-CM

## 2023-07-12 PROCEDURE — 99212 OFFICE O/P EST SF 10 MIN: CPT

## 2023-07-12 NOTE — DATA REVIEWED
[FreeTextEntry1] : 7/72023 - ZOHRA/PVR\par R ZOHRA is 0.92, L ZOHRA is 0.72.\par \par 5/2020 - ZOHRA\par R ZOHRA 0.75, L ZOHRA 0.67

## 2023-07-12 NOTE — PHYSICAL EXAM
[2+] : left 2+ [Calm] : calm [1+] : right 1+ [0] : left 0 [Ankle Swelling (On Exam)] : not present [de-identified] : Well-appearing  [de-identified] : EOMI, anicteric  [de-identified] : soft, nt, nd  [de-identified] : motor and sensation intact in all four extremities  [de-identified] : no wounds on bilateral feet [de-identified] : A&Ox4

## 2023-07-12 NOTE — HISTORY OF PRESENT ILLNESS
[FreeTextEntry1] : CLAUDETTE REYNOSO is a 73 year old male who presents for evaluation of AAA. \par \par Referred by Dr. Ivania Baer (was unable to see PCP at that visit). \par \par Patient states that he has chronic back problems causing leg pain. He follows w/ orthopedic surgeon for his back and recently underwent an MRI. The MRI was significant for multilevel degenerative disc disease. Of note, the MRI also noted an aneurysm that was incompletely imaged. Denies history of abdominal pain. For his back pain, he has gotten injections which has helped with the pain. He also reports a history of intermittent claudication but denies any rest pain or wounds. Patient is a current smoker - smoking 1-2 packs per day for > 50 years. Denies family history of aneurysm\par \par + PMH: HTN, HLD, atrial fibrillation\par + PSH: left shoulder surgery\par + FH: denies\par + SH: current smoker, no etoh use\par + Aller: PCN\par \par PCP is Dr. Alden Paz. \par \par 5/18/2023 - Spoke w/ patient regarding results of recent CTA abd/pelvis for AAA.\par AAA measuring 3.5 x 3.7 cm with ectatic L WILLEM measuring 1.8 cm.\par NO intervention needed. Will plan for duplex in one year.\par Of note, CTA also w/ 1 cm enhancing lesion in left hepatic lobe will refer to pCP for follow up. \par Counseled on smoking cessation.\par Pt is schedule for ZOHRA/PVR in July due to his work schedule. \par Advised on aspirin 81 mg every other day (due to history of easy bruising), continue statin, continue antihypertensives. smoking cessation, walking regimen.  [de-identified] : 7/12/2023 - Pt presents for follow up visit. Patient reports bilateral leg pain intermittently. Reports bilateral leg throbbing spontaneously even at rest or at sleep. He reports tingling sensation in his legs. He has a history of back arthritis and has undergone multiple injections. When he receives cortisone shots, the back feels better and the tingling sensation resolves. But reports that after walking, he feels significant pain in the legs and he feels like he would fall down. Previously been offered spine surgery but patient declined.

## 2023-07-12 NOTE — ASSESSMENT
[FreeTextEntry1] : CLAUDETTE REYNOSO is a 73 year old male presents for evaluation. \par \par > AAA\par - Last ultrasound shows aneurysm measuring 4.4 x 2.9 cm. CTA 5/2023  AAA measuring 3.5 x 3.7 cm with ectatic L WILLEM measuring 1.8 cm.\par - Discussed w/ patient importance of smoking cessation. Gave patient [facility] smoking cessation info packet. \par - Also discussed w/ patient that there is genetic component to AAA. Advised any first degree relatives > 65 years old to be screened for aneurysm. \par - Repeat duplex in 5/2024. \par \par > PAD\par - Reviewed results of recent ZOHRA/PVRs. Stable from prior with mild-moderate arterial insufficiency. Suspect a large part of patient's leg pain is secondary to his significant degenerative disc disease/back pain.\par - Patient without symptoms of critical limb ischemia (rest pain, tissue loss).\par - Medical management of PAD. Patient to start aspirin 81 mg every other day - he reports that he stopped taking aspirin 81 mg due to increased bruising. Continue statin and antihypertensives. \par - Smoking cessation. \par - Advised exercise regimen - ambulating 30 minutes three times per week. \par - Follow up in one year for repeat zohra/pvrs. \par \par > Persistent back pain with leg pain\par - Recommend follow up with spine surgery.

## 2023-07-25 ENCOUNTER — NON-APPOINTMENT (OUTPATIENT)
Age: 73
End: 2023-07-25

## 2023-08-01 ENCOUNTER — APPOINTMENT (OUTPATIENT)
Dept: PAIN MANAGEMENT | Facility: CLINIC | Age: 73
End: 2023-08-01
Payer: MEDICARE

## 2023-08-01 VITALS — BODY MASS INDEX: 28.29 KG/M2 | HEIGHT: 69 IN | WEIGHT: 191 LBS

## 2023-08-01 DIAGNOSIS — M79.18 MYALGIA, OTHER SITE: ICD-10-CM

## 2023-08-01 PROCEDURE — 99214 OFFICE O/P EST MOD 30 MIN: CPT | Mod: 25

## 2023-08-01 PROCEDURE — 20552 NJX 1/MLT TRIGGER POINT 1/2: CPT

## 2023-08-01 PROCEDURE — J3490M: CUSTOM

## 2023-08-01 NOTE — PHYSICAL EXAM
[de-identified] : PHYSICAL EXAM  Constitutional:  Appears well, no apparent distress Ability to communicate: Normal Respiratory: non-labored breathing Skin: no rash noted Head: normocephalic, atraumatic Neck: no visible thyroid enlargement Eyes: extraocular movements intact Neurologic: alert and oriented x3 Psychiatric: normal mood, affect, and behavior  Lumbar Spine:  Palpation: left and right lumbar paraspinal spasm and left and right lumbar paraspinal tenderness to palpation. ROM: Diminished range of motion in all plains.  Patient notes pain with lateral bending to the left and right. MMT: Motor exam is 5/5 through out bilateral lower extremities. Sensation: Light touch and pain is intact throughout bilateral lower extremities. Reflexes: achilles and patella reflexes are intact and  symmetrical.  No sustained clonus. Special Testing: Positive kemps maneuver on the left and right side

## 2023-08-01 NOTE — HISTORY OF PRESENT ILLNESS
[Lower back] : lower back [10] : 10 [Radiating] : radiating [Stabbing] : stabbing [Constant] : constant [Household chores] : household chores [Leisure] : leisure [Sleep] : sleep [Meds] : meds [Ice] : ice [Heat] : heat [Nothing helps with pain getting better] : Nothing helps with pain getting better [Sitting] : sitting [Standing] : standing [Walking] : walking [Lying in bed] : lying in bed [FreeTextEntry1] : Patient is being seen for back pain.  [] : no [FreeTextEntry7] : to both legs

## 2023-10-05 ENCOUNTER — RX RENEWAL (OUTPATIENT)
Age: 73
End: 2023-10-05

## 2023-10-05 ENCOUNTER — APPOINTMENT (OUTPATIENT)
Dept: PAIN MANAGEMENT | Facility: CLINIC | Age: 73
End: 2023-10-05
Payer: MEDICARE

## 2023-10-05 VITALS — BODY MASS INDEX: 26.96 KG/M2 | HEIGHT: 69 IN | WEIGHT: 182 LBS

## 2023-10-05 PROCEDURE — J3490M: CUSTOM

## 2023-10-05 PROCEDURE — 20552 NJX 1/MLT TRIGGER POINT 1/2: CPT

## 2023-10-05 PROCEDURE — 99214 OFFICE O/P EST MOD 30 MIN: CPT | Mod: 25

## 2023-10-10 ENCOUNTER — RX RENEWAL (OUTPATIENT)
Age: 73
End: 2023-10-10

## 2023-10-23 ENCOUNTER — APPOINTMENT (OUTPATIENT)
Dept: PAIN MANAGEMENT | Facility: CLINIC | Age: 73
End: 2023-10-23
Payer: MEDICARE

## 2023-10-23 PROCEDURE — 62323 NJX INTERLAMINAR LMBR/SAC: CPT

## 2023-10-26 ENCOUNTER — NON-APPOINTMENT (OUTPATIENT)
Age: 73
End: 2023-10-26

## 2023-10-26 VITALS — BODY MASS INDEX: 26.96 KG/M2 | WEIGHT: 182 LBS | HEIGHT: 69 IN

## 2023-10-26 DIAGNOSIS — R91.8 OTHER NONSPECIFIC ABNORMAL FINDING OF LUNG FIELD: ICD-10-CM

## 2023-10-26 DIAGNOSIS — F17.210 NICOTINE DEPENDENCE, CIGARETTES, UNCOMPLICATED: ICD-10-CM

## 2023-10-30 ENCOUNTER — OUTPATIENT (OUTPATIENT)
Dept: OUTPATIENT SERVICES | Facility: HOSPITAL | Age: 73
LOS: 1 days | End: 2023-10-30
Payer: MEDICARE

## 2023-10-30 ENCOUNTER — APPOINTMENT (OUTPATIENT)
Dept: CT IMAGING | Facility: CLINIC | Age: 73
End: 2023-10-30
Payer: MEDICARE

## 2023-10-30 DIAGNOSIS — R91.8 OTHER NONSPECIFIC ABNORMAL FINDING OF LUNG FIELD: ICD-10-CM

## 2023-10-30 PROCEDURE — 71271 CT THORAX LUNG CANCER SCR C-: CPT

## 2023-10-30 PROCEDURE — 71271 CT THORAX LUNG CANCER SCR C-: CPT | Mod: 26

## 2023-11-13 ENCOUNTER — NON-APPOINTMENT (OUTPATIENT)
Age: 73
End: 2023-11-13

## 2023-11-13 ENCOUNTER — APPOINTMENT (OUTPATIENT)
Dept: INTERNAL MEDICINE | Facility: CLINIC | Age: 73
End: 2023-11-13
Payer: MEDICARE

## 2023-11-13 VITALS
HEIGHT: 69 IN | HEART RATE: 55 BPM | WEIGHT: 178 LBS | BODY MASS INDEX: 26.36 KG/M2 | SYSTOLIC BLOOD PRESSURE: 145 MMHG | OXYGEN SATURATION: 98 % | DIASTOLIC BLOOD PRESSURE: 80 MMHG

## 2023-11-13 VITALS — SYSTOLIC BLOOD PRESSURE: 155 MMHG | DIASTOLIC BLOOD PRESSURE: 60 MMHG

## 2023-11-13 DIAGNOSIS — E11.9 TYPE 2 DIABETES MELLITUS W/OUT COMPLICATIONS: ICD-10-CM

## 2023-11-13 DIAGNOSIS — F17.200 NICOTINE DEPENDENCE, UNSPECIFIED, UNCOMPLICATED: ICD-10-CM

## 2023-11-13 DIAGNOSIS — Z00.00 ENCOUNTER FOR GENERAL ADULT MEDICAL EXAMINATION W/OUT ABNORMAL FINDINGS: ICD-10-CM

## 2023-11-13 DIAGNOSIS — R91.8 OTHER NONSPECIFIC ABNORMAL FINDING OF LUNG FIELD: ICD-10-CM

## 2023-11-13 PROCEDURE — G0008: CPT

## 2023-11-13 PROCEDURE — 90715 TDAP VACCINE 7 YRS/> IM: CPT

## 2023-11-13 PROCEDURE — 90662 IIV NO PRSV INCREASED AG IM: CPT

## 2023-11-13 PROCEDURE — 99214 OFFICE O/P EST MOD 30 MIN: CPT | Mod: 25

## 2023-11-13 PROCEDURE — 90472 IMMUNIZATION ADMIN EACH ADD: CPT

## 2023-11-13 PROCEDURE — 99406 BEHAV CHNG SMOKING 3-10 MIN: CPT | Mod: 25

## 2023-11-13 PROCEDURE — G0439: CPT

## 2023-11-13 RX ORDER — VARENICLINE TARTRATE 0.5 (11)-1
0.5 MG X 11 & KIT ORAL
Qty: 1 | Refills: 0 | Status: ACTIVE | COMMUNITY
Start: 2023-11-13 | End: 1900-01-01

## 2023-11-13 RX ORDER — APIXABAN 5 MG/1
5 TABLET, FILM COATED ORAL
Qty: 60 | Refills: 0 | Status: DISCONTINUED | COMMUNITY
Start: 2023-03-22 | End: 2023-11-13

## 2023-11-13 RX ORDER — APIXABAN 5 MG/1
5 TABLET, FILM COATED ORAL
Qty: 180 | Refills: 1 | Status: ACTIVE | COMMUNITY
Start: 2023-11-13 | End: 1900-01-01

## 2023-11-13 RX ORDER — METOPROLOL SUCCINATE 25 MG/1
25 TABLET, EXTENDED RELEASE ORAL
Qty: 90 | Refills: 3 | Status: DISCONTINUED | COMMUNITY
Start: 2023-10-10 | End: 2023-11-13

## 2023-11-13 RX ORDER — OLMESARTAN MEDOXOMIL 40 MG/1
40 TABLET, FILM COATED ORAL
Refills: 0 | Status: ACTIVE | COMMUNITY
Start: 2023-11-13

## 2023-11-13 RX ORDER — ASPIRIN 81 MG
81 TABLET, DELAYED RELEASE (ENTERIC COATED) ORAL
Refills: 0 | Status: DISCONTINUED | COMMUNITY
End: 2023-11-13

## 2023-11-15 ENCOUNTER — APPOINTMENT (OUTPATIENT)
Dept: PAIN MANAGEMENT | Facility: CLINIC | Age: 73
End: 2023-11-15
Payer: MEDICARE

## 2023-11-15 VITALS — HEIGHT: 69 IN | BODY MASS INDEX: 26.36 KG/M2 | WEIGHT: 178 LBS

## 2023-11-15 PROCEDURE — 99213 OFFICE O/P EST LOW 20 MIN: CPT

## 2023-11-17 LAB
ALBUMIN SERPL ELPH-MCNC: 4 G/DL
ALP BLD-CCNC: 114 U/L
ALT SERPL-CCNC: 18 U/L
ANION GAP SERPL CALC-SCNC: 12 MMOL/L
AST SERPL-CCNC: 14 U/L
BILIRUB SERPL-MCNC: 0.5 MG/DL
BUN SERPL-MCNC: 21 MG/DL
CALCIUM SERPL-MCNC: 9.6 MG/DL
CHLORIDE SERPL-SCNC: 106 MMOL/L
CHOLEST SERPL-MCNC: 176 MG/DL
CO2 SERPL-SCNC: 25 MMOL/L
CREAT SERPL-MCNC: 1.33 MG/DL
CREAT SPEC-SCNC: 110 MG/DL
EGFR: 56 ML/MIN/1.73M2
ESTIMATED AVERAGE GLUCOSE: 157 MG/DL
GLUCOSE SERPL-MCNC: 136 MG/DL
HBA1C MFR BLD HPLC: 7.1 %
HCT VFR BLD CALC: 48 %
HDLC SERPL-MCNC: 54 MG/DL
HGB BLD-MCNC: 15.7 G/DL
LDLC SERPL CALC-MCNC: 96 MG/DL
MCHC RBC-ENTMCNC: 32.4 PG
MCHC RBC-ENTMCNC: 32.7 GM/DL
MCV RBC AUTO: 99 FL
MICROALBUMIN 24H UR DL<=1MG/L-MCNC: 3.3 MG/DL
MICROALBUMIN/CREAT 24H UR-RTO: 30 MG/G
NONHDLC SERPL-MCNC: 122 MG/DL
PLATELET # BLD AUTO: 299 K/UL
POTASSIUM SERPL-SCNC: 4.8 MMOL/L
PROT SERPL-MCNC: 6.5 G/DL
RBC # BLD: 4.85 M/UL
RBC # FLD: 14.2 %
SODIUM SERPL-SCNC: 143 MMOL/L
TRIGL SERPL-MCNC: 147 MG/DL
WBC # FLD AUTO: 9.79 K/UL

## 2023-11-21 ENCOUNTER — NON-APPOINTMENT (OUTPATIENT)
Age: 73
End: 2023-11-21

## 2023-11-27 ENCOUNTER — APPOINTMENT (OUTPATIENT)
Dept: PAIN MANAGEMENT | Facility: CLINIC | Age: 73
End: 2023-11-27
Payer: MEDICARE

## 2023-11-27 PROCEDURE — 62323 NJX INTERLAMINAR LMBR/SAC: CPT

## 2023-12-04 ENCOUNTER — APPOINTMENT (OUTPATIENT)
Dept: INTERNAL MEDICINE | Facility: CLINIC | Age: 73
End: 2023-12-04
Payer: MEDICARE

## 2023-12-04 VITALS
DIASTOLIC BLOOD PRESSURE: 84 MMHG | HEART RATE: 58 BPM | SYSTOLIC BLOOD PRESSURE: 152 MMHG | OXYGEN SATURATION: 98 % | BODY MASS INDEX: 26.07 KG/M2 | WEIGHT: 176 LBS | HEIGHT: 69 IN

## 2023-12-04 DIAGNOSIS — R63.4 ABNORMAL WEIGHT LOSS: ICD-10-CM

## 2023-12-04 PROCEDURE — 99214 OFFICE O/P EST MOD 30 MIN: CPT

## 2023-12-04 RX ORDER — CHLORTHALIDONE 25 MG/1
25 TABLET ORAL DAILY
Qty: 30 | Refills: 0 | Status: ACTIVE | COMMUNITY
Start: 2023-11-13 | End: 1900-01-01

## 2023-12-18 ENCOUNTER — APPOINTMENT (OUTPATIENT)
Dept: CT IMAGING | Facility: CLINIC | Age: 73
End: 2023-12-18
Payer: MEDICARE

## 2023-12-18 ENCOUNTER — OUTPATIENT (OUTPATIENT)
Dept: OUTPATIENT SERVICES | Facility: HOSPITAL | Age: 73
LOS: 1 days | End: 2023-12-18
Payer: MEDICARE

## 2023-12-18 DIAGNOSIS — F17.218 NICOTINE DEPENDENCE, CIGARETTES, WITH OTHER NICOTINE-INDUCED DISORDERS: ICD-10-CM

## 2023-12-18 PROCEDURE — 71250 CT THORAX DX C-: CPT | Mod: 26

## 2023-12-18 PROCEDURE — 71250 CT THORAX DX C-: CPT

## 2023-12-19 ENCOUNTER — APPOINTMENT (OUTPATIENT)
Dept: INTERNAL MEDICINE | Facility: CLINIC | Age: 73
End: 2023-12-19
Payer: MEDICARE

## 2023-12-19 VITALS
WEIGHT: 176 LBS | DIASTOLIC BLOOD PRESSURE: 70 MMHG | HEIGHT: 69 IN | RESPIRATION RATE: 16 BRPM | HEART RATE: 63 BPM | BODY MASS INDEX: 26.07 KG/M2 | OXYGEN SATURATION: 97 % | SYSTOLIC BLOOD PRESSURE: 143 MMHG | TEMPERATURE: 97.5 F

## 2023-12-19 PROCEDURE — 99213 OFFICE O/P EST LOW 20 MIN: CPT

## 2023-12-19 RX ORDER — HYDRALAZINE HYDROCHLORIDE 25 MG/1
25 TABLET ORAL TWICE DAILY
Qty: 180 | Refills: 0 | Status: DISCONTINUED | COMMUNITY
Start: 2023-11-17 | End: 2023-12-19

## 2023-12-19 NOTE — ASSESSMENT
[FreeTextEntry1] : 73M with T2DM, HTN, HLD, carotid stenosis, AAA, A Fib on Eliquis, PAD, current smoker is coming in for HTN management.  #HTN -BP improved today -C/w Amlodipine, Olmesartan, Chlorthalidone at current doses  -Patient states he tolerated low dose of Metoprolol previously, but sypmotmatic bardycardia with higher doses. Will have patient follow up w/ Cards for consideration of low dose BB -F/u w/ Cards   #A Fib -Refuses AC at this time.  -F/u w/ Cards   RTC in 3 weeks

## 2023-12-19 NOTE — HISTORY OF PRESENT ILLNESS
[FreeTextEntry1] : 73M with T2DM, HTN, HLD, carotid stenosis, AAA, A Fib on Eliquis, PAD, current smoker is coming in for HTN management. [de-identified] : 73M with T2DM, HTN, HLD, carotid stenosis, AAA, A Fib on Eliquis, PAD, current smoker is coming in for HTN management.  Home SBPs have been variable, 110s-170s at home.   States he keeps running to the bathroom, since starting Chlorthalidone. Currently taking Chlorthalidone, Olmesartan, Amlodipine.   No longer taking hydralazine or BB

## 2023-12-21 ENCOUNTER — APPOINTMENT (OUTPATIENT)
Dept: PAIN MANAGEMENT | Facility: CLINIC | Age: 73
End: 2023-12-21

## 2023-12-22 ENCOUNTER — APPOINTMENT (OUTPATIENT)
Dept: CARDIOLOGY | Facility: CLINIC | Age: 73
End: 2023-12-22
Payer: MEDICARE

## 2023-12-22 ENCOUNTER — NON-APPOINTMENT (OUTPATIENT)
Age: 73
End: 2023-12-22

## 2023-12-22 VITALS
OXYGEN SATURATION: 96 % | HEIGHT: 69 IN | WEIGHT: 179 LBS | BODY MASS INDEX: 26.51 KG/M2 | SYSTOLIC BLOOD PRESSURE: 130 MMHG | DIASTOLIC BLOOD PRESSURE: 67 MMHG | HEART RATE: 62 BPM

## 2023-12-22 VITALS — SYSTOLIC BLOOD PRESSURE: 122 MMHG | DIASTOLIC BLOOD PRESSURE: 60 MMHG

## 2023-12-22 PROCEDURE — 93000 ELECTROCARDIOGRAM COMPLETE: CPT

## 2023-12-22 PROCEDURE — 99214 OFFICE O/P EST MOD 30 MIN: CPT | Mod: 25

## 2023-12-22 NOTE — DISCUSSION/SUMMARY
[FreeTextEntry1] : PAF: Noted on prior ECG. Refusing AC. Aware of risks of CVA, he will "take his chances"  Recommend AC Avoid AVN blockers given baseline bradycardia  HTN: Running lower today. Intolerant to chlorthalidone. Would dc and continue amlodipine, olmesartan  GINNY: Cont statin. Vascular follow up. Recommended QOD aspirin  AAA: BP control, vascular eval ongoing  PAD: Supervised exercise, Asa, statin, vascular eval ongoing  Smoking cessation encouraged   RV 3M [EKG obtained to assist in diagnosis and management of assessed problem(s)] : EKG obtained to assist in diagnosis and management of assessed problem(s)

## 2023-12-22 NOTE — HISTORY OF PRESENT ILLNESS
[FreeTextEntry1] : 73M with HTN, HLD, carotid stenosis, AAA, current smoker, pAF  Last seen in March 2023, had been noted witih Afib on ECG Eliquis was prescribed, pt refused to take Metoprolol was held in past due to baseline bradycardia Chlorthalidone was added but pt urinating too frequently, does not like taking it   Pt otherwise feels well without complaints Denies CP, dyspnea, lightheadedness Claudication symptoms with walking long distances, walking up flights of stairs  Ongoing vascular issues Seeing vascular surgery for stable AAA (On CT measured 3.5cm x 3.7cm), stable PAD  Current smoker. Drives a limo. Denies family hx of CAD  ECG: AFib at 91 TTE 2023:  1. Normal left ventricular cavity size. The left ventricular wall thickness is normal. The left ventricular systolic function is normal with an ejection fraction of 68 % There are no regional wall motion abnormalities seen. 2. There is normal left ventricular diastolic function. 3. Normal right ventricular cavity size and normal wall thickness. 4. The left atrium is normal. 5. Fibrocalcific aortic valve sclerosis without stenosis. 6. The proximal ascending aorta is borderline dilated.  Atorvastatin 40mg   Amlodipine 10mg Olmesartan 40mg Chlorthalidone 25mg (will dc)

## 2023-12-25 ENCOUNTER — RX RENEWAL (OUTPATIENT)
Age: 73
End: 2023-12-25

## 2023-12-25 RX ORDER — OLMESARTAN MEDOXOMIL 40 MG/1
40 TABLET, FILM COATED ORAL DAILY
Qty: 90 | Refills: 3 | Status: ACTIVE | COMMUNITY
Start: 2020-12-31 | End: 1900-01-01

## 2024-01-01 ENCOUNTER — RX RENEWAL (OUTPATIENT)
Age: 74
End: 2024-01-01

## 2024-01-15 ENCOUNTER — NON-APPOINTMENT (OUTPATIENT)
Age: 74
End: 2024-01-15

## 2024-01-18 ENCOUNTER — APPOINTMENT (OUTPATIENT)
Dept: PAIN MANAGEMENT | Facility: CLINIC | Age: 74
End: 2024-01-18
Payer: MEDICARE

## 2024-01-18 VITALS — HEIGHT: 69 IN | WEIGHT: 179 LBS | BODY MASS INDEX: 26.51 KG/M2

## 2024-01-18 DIAGNOSIS — M54.17 RADICULOPATHY, LUMBOSACRAL REGION: ICD-10-CM

## 2024-01-18 DIAGNOSIS — M54.50 LOW BACK PAIN, UNSPECIFIED: ICD-10-CM

## 2024-01-18 PROCEDURE — 99213 OFFICE O/P EST LOW 20 MIN: CPT

## 2024-01-18 RX ORDER — GABAPENTIN 300 MG/1
300 CAPSULE ORAL TWICE DAILY
Qty: 60 | Refills: 0 | Status: ACTIVE | COMMUNITY
Start: 2024-01-18 | End: 1900-01-01

## 2024-01-18 NOTE — DISCUSSION/SUMMARY
[de-identified] : proceed BL LS MBB pt does not wish to proceed injections at this time, also recommended possible repeat LESI which he declines  will also trial gabapentin for leg pain he gets when he has back pain   After discussing various treatment options with the patient including but not limited to oral medications, physical therapy, exercise, modalities as well as interventional spinal injections, we have decided with the following plan: I personally reviewed the MRI/CT scan images and agree with the radiologist's report. The radiological findings were discussed with the patient. The risks, benefits, contents and alternatives to injection were explained in full to the patient. Risks outlined include but are not limited to infection,sepsis, bleeding, post-dural puncture headache, nerve damage, temporary increase in pain, syncopal episode, failure to resolve symptoms, allergic reaction, symptom recurrence, and elevation of blood sugar in diabetics. Cortisone may cause immunosuppression. Patient understands the risks. All questions were answered. After discussion of options, patient requested an injection. Information regarding the injection was given to the patient. Which medications to stop prior to the injection was explained to the patient as well. Follow up in 1-2 weeks post injection for re-evaluation. Continue Home exercises, stretching, activity modification, physical therapy, and conservative care. Patient is presenting with acute/sub-acute radicular pain with impairment in ADLs and functionality. The pain has not responded to conservative care including nsaid therapy and/or physical therapy. There is no bleeding tendency, unstable medical condition, or systemic infection.

## 2024-01-18 NOTE — ASSESSMENT
[FreeTextEntry1] : LESI with 20% relief pp x 2 weeks improved back and leg pain  states that  he had  no leg pain when he had no back pain  main issue is lower back pain

## 2024-01-18 NOTE — HISTORY OF PRESENT ILLNESS
[Lower back] : lower back [5] : 5 [Radiating] : radiating [Constant] : constant [Standing] : standing [Walking] : walking [] : no [FreeTextEntry7] : b/l legs [FreeTextEntry9] : tylenol

## 2024-01-18 NOTE — PHYSICAL EXAM
[de-identified] : PHYSICAL EXAM  Constitutional:  Appears well, no apparent distress Ability to communicate: Normal Respiratory: non-labored breathing Skin: no rash noted Head: normocephalic, atraumatic Neck: no visible thyroid enlargement Eyes: extraocular movements intact Neurologic: alert and oriented x3 Psychiatric: normal mood, affect, and behavior  Lumbar Spine:  Palpation: left and right lumbar paraspinal spasm and left and right lumbar paraspinal tenderness to palpation. ROM: Diminished range of motion in all plains.  Patient notes pain with lateral bending to the left and right. MMT: Motor exam is 5/5 through out bilateral lower extremities. Sensation: Light touch and pain is intact throughout bilateral lower extremities. Reflexes: achilles and patella reflexes are intact and  symmetrical.  No sustained clonus. Special Testing: Positive straight leg raise on the left and right side.  Assessemnt: Radiculopathy of lumbosacral region (M54.17) Myalgia (M79.10)  Plan: After discussing various treatment options with the patient including but not limited to oral medications, physical therapy, exercise modalities as well as interventional spinal injections, we have decided with the following plan: The patient is presenting with acute/sub-acute radicular pain with impairment in ADLs and functionality.  The pain has not responded to conservative care including NSAID therapy and/or physical therapy.  There is no bleeding tendency, unstable medical condition, or systemic infection  The risks, benefits and alternatives of the proposed procedure were explained in detail with the patient.  The risks outlined include but are not limited to infection, bleeding, post dural puncture headache, nerve injury, a temporary increase in pain, failure to resolve symptoms, allergic reaction, symptom recurrence, and possible elevation of blood sugar.  All questions were answered to patient's satisfaction and he/she verbalized an understanding.  Follow up 1-2 weeks post injection foe re-evaluation.  Continue home exercises, stretching, activity modification, physical therapy, and conservative care.

## 2024-02-26 ENCOUNTER — RX RENEWAL (OUTPATIENT)
Age: 74
End: 2024-02-26

## 2024-02-26 RX ORDER — AMLODIPINE BESYLATE 10 MG/1
10 TABLET ORAL
Qty: 90 | Refills: 1 | Status: ACTIVE | COMMUNITY
Start: 2017-04-18 | End: 1900-01-01

## 2024-03-28 ENCOUNTER — APPOINTMENT (OUTPATIENT)
Dept: CARDIOLOGY | Facility: CLINIC | Age: 74
End: 2024-03-28
Payer: MEDICARE

## 2024-03-28 VITALS
OXYGEN SATURATION: 97 % | HEART RATE: 59 BPM | WEIGHT: 188 LBS | HEIGHT: 69 IN | SYSTOLIC BLOOD PRESSURE: 147 MMHG | BODY MASS INDEX: 27.85 KG/M2 | DIASTOLIC BLOOD PRESSURE: 66 MMHG

## 2024-03-28 VITALS — DIASTOLIC BLOOD PRESSURE: 62 MMHG | SYSTOLIC BLOOD PRESSURE: 142 MMHG

## 2024-03-28 DIAGNOSIS — I48.91 UNSPECIFIED ATRIAL FIBRILLATION: ICD-10-CM

## 2024-03-28 DIAGNOSIS — I71.40 ABDOMINAL AORTIC ANEURYSM, WITHOUT RUPTURE, UNSPECIFIED: ICD-10-CM

## 2024-03-28 DIAGNOSIS — I73.9 PERIPHERAL VASCULAR DISEASE, UNSPECIFIED: ICD-10-CM

## 2024-03-28 DIAGNOSIS — E78.00 PURE HYPERCHOLESTEROLEMIA, UNSPECIFIED: ICD-10-CM

## 2024-03-28 DIAGNOSIS — I10 ESSENTIAL (PRIMARY) HYPERTENSION: ICD-10-CM

## 2024-03-28 PROCEDURE — G2211 COMPLEX E/M VISIT ADD ON: CPT

## 2024-03-28 PROCEDURE — 99214 OFFICE O/P EST MOD 30 MIN: CPT | Mod: 25

## 2024-03-28 PROCEDURE — 99406 BEHAV CHNG SMOKING 3-10 MIN: CPT

## 2024-03-28 NOTE — HISTORY OF PRESENT ILLNESS
[FreeTextEntry1] : 74M with HTN, HLD, carotid stenosis, AAA, current smoker, pAF  Continues to refuse Eliquis for pAF Complaining of worsening claudication symptoms, legs burning after 20 seconds of walking Denies CP, lightheadedness Last saw vascular 1 year ago Wife thinks he is breathing heavier   HISTORY:  Pt noted witih Afib on ECG in 3/2023 Eliquis was prescribed, pt refused to take Metoprolol was held in past due to baseline bradycardia Chlorthalidone was added but pt urinating too frequently, did not like taking it - discontinued at last visit   Ongoing vascular issues Seeing vascular surgery for stable AAA (On CT measured 3.5cm x 3.7cm), stable PAD  Current smoker. Drives a limo. Denies family hx of CAD  ECG: SR with frequent PACs TTE 2023:  1. Normal left ventricular cavity size. The left ventricular wall thickness is normal. The left ventricular systolic function is normal with an ejection fraction of 68 % There are no regional wall motion abnormalities seen. 2. There is normal left ventricular diastolic function. 3. Normal right ventricular cavity size and normal wall thickness. 4. The left atrium is normal. 5. Fibrocalcific aortic valve sclerosis without stenosis. 6. The proximal ascending aorta is borderline dilated.  Atorvastatin 40mg   Amlodipine 10mg Olmesartan 40mg

## 2024-03-28 NOTE — DISCUSSION/SUMMARY
[FreeTextEntry1] : PAF: Noted on prior ECG. Refusing AC. Aware of risks of CVA, he will "take his chances"  Recommend AC Avoid AVN blockers given baseline bradycardia  HTN: slightly better on recheck. OK for age. Would continue with amlodipine, olmesartan  GINNY: Cont statin. Vascular follow up. Recommended QOD aspirin- stopped taking due to easy bruising   AAA: BP control, vascular eval ongoing  PAD: Supervised exercise, Asa, statin, needs to see vascular again   Smoking cessation encouraged   RV 3-4M

## 2024-03-28 NOTE — PHYSICAL EXAM
[Well Developed] : well developed [Well Nourished] : well nourished [No Acute Distress] : no acute distress [Normal Conjunctiva] : normal conjunctiva [Normal Venous Pressure] : normal venous pressure [No Carotid Bruit] : no carotid bruit [Normal S1, S2] : normal S1, S2 [No Murmur] : no murmur [No Rub] : no rub [No Gallop] : no gallop [Clear Lung Fields] : clear lung fields [Good Air Entry] : good air entry [No Respiratory Distress] : no respiratory distress  [Non Tender] : non-tender [Soft] : abdomen soft [Normal Bowel Sounds] : normal bowel sounds [No Masses/organomegaly] : no masses/organomegaly [No Edema] : no edema [Normal Gait] : normal gait [No Cyanosis] : no cyanosis [No Varicosities] : no varicosities [No Clubbing] : no clubbing [No Skin Lesions] : no skin lesions [No Rash] : no rash [Moves all extremities] : moves all extremities [Normal Speech] : normal speech [No Focal Deficits] : no focal deficits [Alert and Oriented] : alert and oriented [Normal memory] : normal memory [de-identified] : Bradycardic, extrasystoles

## 2024-05-08 ENCOUNTER — APPOINTMENT (OUTPATIENT)
Dept: VASCULAR SURGERY | Facility: CLINIC | Age: 74
End: 2024-05-08

## 2024-07-01 ENCOUNTER — RX RENEWAL (OUTPATIENT)
Age: 74
End: 2024-07-01

## 2024-07-09 DIAGNOSIS — R91.8 OTHER NONSPECIFIC ABNORMAL FINDING OF LUNG FIELD: ICD-10-CM

## 2024-07-19 ENCOUNTER — OUTPATIENT (OUTPATIENT)
Dept: OUTPATIENT SERVICES | Facility: HOSPITAL | Age: 74
LOS: 1 days | End: 2024-07-19
Payer: MEDICARE

## 2024-07-19 ENCOUNTER — APPOINTMENT (OUTPATIENT)
Dept: CT IMAGING | Facility: CLINIC | Age: 74
End: 2024-07-19

## 2024-07-19 DIAGNOSIS — Z00.00 ENCOUNTER FOR GENERAL ADULT MEDICAL EXAMINATION WITHOUT ABNORMAL FINDINGS: ICD-10-CM

## 2024-07-19 PROCEDURE — 71250 CT THORAX DX C-: CPT

## 2024-07-19 PROCEDURE — 71250 CT THORAX DX C-: CPT | Mod: 26

## 2024-08-23 ENCOUNTER — RX RENEWAL (OUTPATIENT)
Age: 74
End: 2024-08-23

## 2024-09-09 ENCOUNTER — RX RENEWAL (OUTPATIENT)
Age: 74
End: 2024-09-09

## 2024-11-14 ENCOUNTER — NON-APPOINTMENT (OUTPATIENT)
Age: 74
End: 2024-11-14

## 2024-11-14 ENCOUNTER — APPOINTMENT (OUTPATIENT)
Dept: INTERNAL MEDICINE | Facility: CLINIC | Age: 74
End: 2024-11-14
Payer: MEDICARE

## 2024-11-14 VITALS
WEIGHT: 188 LBS | HEART RATE: 56 BPM | SYSTOLIC BLOOD PRESSURE: 114 MMHG | HEIGHT: 69 IN | OXYGEN SATURATION: 97 % | BODY MASS INDEX: 27.85 KG/M2 | DIASTOLIC BLOOD PRESSURE: 58 MMHG

## 2024-11-14 DIAGNOSIS — N40.1 BENIGN PROSTATIC HYPERPLASIA WITH LOWER URINARY TRACT SYMPMS: ICD-10-CM

## 2024-11-14 DIAGNOSIS — E78.00 PURE HYPERCHOLESTEROLEMIA, UNSPECIFIED: ICD-10-CM

## 2024-11-14 DIAGNOSIS — N48.89 OTHER SPECIFIED DISORDERS OF PENIS: ICD-10-CM

## 2024-11-14 DIAGNOSIS — E07.9 DISORDER OF THYROID, UNSPECIFIED: ICD-10-CM

## 2024-11-14 DIAGNOSIS — N13.8 BENIGN PROSTATIC HYPERPLASIA WITH LOWER URINARY TRACT SYMPMS: ICD-10-CM

## 2024-11-14 DIAGNOSIS — H91.93 UNSPECIFIED HEARING LOSS, BILATERAL: ICD-10-CM

## 2024-11-14 DIAGNOSIS — I48.91 UNSPECIFIED ATRIAL FIBRILLATION: ICD-10-CM

## 2024-11-14 DIAGNOSIS — E53.8 DEFICIENCY OF OTHER SPECIFIED B GROUP VITAMINS: ICD-10-CM

## 2024-11-14 DIAGNOSIS — R73.9 HYPERGLYCEMIA, UNSPECIFIED: ICD-10-CM

## 2024-11-14 DIAGNOSIS — N50.89 OTHER SPECIFIED DISORDERS OF THE MALE GENITAL ORGANS: ICD-10-CM

## 2024-11-14 DIAGNOSIS — Z00.00 ENCOUNTER FOR GENERAL ADULT MEDICAL EXAMINATION W/OUT ABNORMAL FINDINGS: ICD-10-CM

## 2024-11-14 DIAGNOSIS — F17.200 NICOTINE DEPENDENCE, UNSPECIFIED, UNCOMPLICATED: ICD-10-CM

## 2024-11-14 DIAGNOSIS — E55.9 VITAMIN D DEFICIENCY, UNSPECIFIED: ICD-10-CM

## 2024-11-14 DIAGNOSIS — I10 ESSENTIAL (PRIMARY) HYPERTENSION: ICD-10-CM

## 2024-11-14 DIAGNOSIS — E11.9 TYPE 2 DIABETES MELLITUS W/OUT COMPLICATIONS: ICD-10-CM

## 2024-11-14 PROCEDURE — G0008: CPT

## 2024-11-14 PROCEDURE — 36415 COLL VENOUS BLD VENIPUNCTURE: CPT

## 2024-11-14 PROCEDURE — G0439: CPT

## 2024-11-14 PROCEDURE — 90662 IIV NO PRSV INCREASED AG IM: CPT

## 2024-11-14 PROCEDURE — 93000 ELECTROCARDIOGRAM COMPLETE: CPT

## 2024-11-19 ENCOUNTER — NON-APPOINTMENT (OUTPATIENT)
Age: 74
End: 2024-11-19

## 2024-11-19 LAB
25(OH)D3 SERPL-MCNC: 26.4 NG/ML
ALBUMIN SERPL ELPH-MCNC: 4.3 G/DL
ALP BLD-CCNC: 124 U/L
ALT SERPL-CCNC: 16 U/L
ANION GAP SERPL CALC-SCNC: 13 MMOL/L
APPEARANCE: CLEAR
AST SERPL-CCNC: 16 U/L
BACTERIA: NEGATIVE /HPF
BASOPHILS # BLD AUTO: 0.08 K/UL
BASOPHILS NFR BLD AUTO: 0.8 %
BILIRUB SERPL-MCNC: 0.4 MG/DL
BILIRUBIN URINE: NEGATIVE
BLOOD URINE: NEGATIVE
BUN SERPL-MCNC: 24 MG/DL
CALCIUM SERPL-MCNC: 9.9 MG/DL
CAST: 0 /LPF
CHLORIDE SERPL-SCNC: 104 MMOL/L
CHOLEST SERPL-MCNC: 170 MG/DL
CO2 SERPL-SCNC: 24 MMOL/L
COLOR: YELLOW
CREAT SERPL-MCNC: 1.43 MG/DL
EGFR: 51 ML/MIN/1.73M2
EOSINOPHIL # BLD AUTO: 0.15 K/UL
EOSINOPHIL NFR BLD AUTO: 1.6 %
EPITHELIAL CELLS: 0 /HPF
ESTIMATED AVERAGE GLUCOSE: 148 MG/DL
GLUCOSE QUALITATIVE U: NEGATIVE MG/DL
GLUCOSE SERPL-MCNC: 102 MG/DL
HBA1C MFR BLD HPLC: 6.8 %
HCT VFR BLD CALC: 46.3 %
HDLC SERPL-MCNC: 44 MG/DL
HGB BLD-MCNC: 14.9 G/DL
IMM GRANULOCYTES NFR BLD AUTO: 0.3 %
KETONES URINE: NEGATIVE MG/DL
LDLC SERPL CALC-MCNC: 93 MG/DL
LEUKOCYTE ESTERASE URINE: ABNORMAL
LYMPHOCYTES # BLD AUTO: 2.7 K/UL
LYMPHOCYTES NFR BLD AUTO: 28.1 %
MAN DIFF?: NORMAL
MCHC RBC-ENTMCNC: 30.6 PG
MCHC RBC-ENTMCNC: 32.2 G/DL
MCV RBC AUTO: 95.1 FL
MICROSCOPIC-UA: NORMAL
MONOCYTES # BLD AUTO: 0.76 K/UL
MONOCYTES NFR BLD AUTO: 7.9 %
NEUTROPHILS # BLD AUTO: 5.88 K/UL
NEUTROPHILS NFR BLD AUTO: 61.3 %
NITRITE URINE: NEGATIVE
NONHDLC SERPL-MCNC: 126 MG/DL
PH URINE: 6
PLATELET # BLD AUTO: 286 K/UL
POTASSIUM SERPL-SCNC: 4.8 MMOL/L
PROT SERPL-MCNC: 6.8 G/DL
PROTEIN URINE: NEGATIVE MG/DL
PSA SERPL-MCNC: 7.83 NG/ML
RBC # BLD: 4.87 M/UL
RBC # FLD: 14.3 %
RED BLOOD CELLS URINE: 1 /HPF
SODIUM SERPL-SCNC: 140 MMOL/L
SPECIFIC GRAVITY URINE: 1.01
TRIGL SERPL-MCNC: 187 MG/DL
TSH SERPL-ACNC: 1.57 UIU/ML
UROBILINOGEN URINE: 0.2 MG/DL
VIT B12 SERPL-MCNC: 350 PG/ML
WBC # FLD AUTO: 9.6 K/UL
WHITE BLOOD CELLS URINE: 4 /HPF

## 2025-01-06 ENCOUNTER — APPOINTMENT (OUTPATIENT)
Dept: INTERNAL MEDICINE | Facility: CLINIC | Age: 75
End: 2025-01-06

## 2025-06-17 ENCOUNTER — APPOINTMENT (OUTPATIENT)
Dept: ORTHOPEDIC SURGERY | Facility: CLINIC | Age: 75
End: 2025-06-17
Payer: MEDICARE

## 2025-06-17 VITALS — HEIGHT: 69 IN | BODY MASS INDEX: 25.77 KG/M2 | WEIGHT: 174 LBS

## 2025-06-17 PROCEDURE — 99204 OFFICE O/P NEW MOD 45 MIN: CPT | Mod: 25

## 2025-06-17 PROCEDURE — 20611 DRAIN/INJ JOINT/BURSA W/US: CPT | Mod: RT

## 2025-06-17 PROCEDURE — J3490M: CUSTOM | Mod: JZ

## 2025-06-17 PROCEDURE — 73562 X-RAY EXAM OF KNEE 3: CPT | Mod: RT

## 2025-06-17 RX ORDER — IBUPROFEN 600 MG/1
600 TABLET, FILM COATED ORAL TWICE DAILY
Qty: 60 | Refills: 3 | Status: ACTIVE | COMMUNITY
Start: 2025-06-17 | End: 1900-01-01

## 2025-06-26 ENCOUNTER — RX RENEWAL (OUTPATIENT)
Age: 75
End: 2025-06-26

## 2025-07-09 ENCOUNTER — RX RENEWAL (OUTPATIENT)
Age: 75
End: 2025-07-09

## 2025-07-15 ENCOUNTER — RX RENEWAL (OUTPATIENT)
Age: 75
End: 2025-07-15

## 2025-07-22 ENCOUNTER — RX RENEWAL (OUTPATIENT)
Age: 75
End: 2025-07-22

## 2025-07-28 ENCOUNTER — NON-APPOINTMENT (OUTPATIENT)
Age: 75
End: 2025-07-28

## 2025-07-28 VITALS — WEIGHT: 174 LBS | HEIGHT: 69 IN | BODY MASS INDEX: 25.77 KG/M2

## 2025-07-28 DIAGNOSIS — F17.210 NICOTINE DEPENDENCE, CIGARETTES, UNCOMPLICATED: ICD-10-CM

## 2025-07-29 ENCOUNTER — OUTPATIENT (OUTPATIENT)
Dept: OUTPATIENT SERVICES | Facility: HOSPITAL | Age: 75
LOS: 1 days | End: 2025-07-29
Payer: MEDICARE

## 2025-07-29 ENCOUNTER — APPOINTMENT (OUTPATIENT)
Dept: CT IMAGING | Facility: CLINIC | Age: 75
End: 2025-07-29
Payer: MEDICARE

## 2025-07-29 ENCOUNTER — APPOINTMENT (OUTPATIENT)
Dept: ORTHOPEDIC SURGERY | Facility: CLINIC | Age: 75
End: 2025-07-29
Payer: MEDICARE

## 2025-07-29 VITALS — WEIGHT: 174 LBS | HEIGHT: 69 IN | BODY MASS INDEX: 25.77 KG/M2

## 2025-07-29 DIAGNOSIS — F17.200 NICOTINE DEPENDENCE, UNSPECIFIED, UNCOMPLICATED: ICD-10-CM

## 2025-07-29 PROCEDURE — 71271 CT THORAX LUNG CANCER SCR C-: CPT

## 2025-07-29 PROCEDURE — 99214 OFFICE O/P EST MOD 30 MIN: CPT

## 2025-07-29 PROCEDURE — 71271 CT THORAX LUNG CANCER SCR C-: CPT | Mod: 26

## 2025-07-31 ENCOUNTER — APPOINTMENT (OUTPATIENT)
Dept: ORTHOPEDIC SURGERY | Facility: CLINIC | Age: 75
End: 2025-07-31
Payer: MEDICARE

## 2025-07-31 DIAGNOSIS — M17.11 UNILATERAL PRIMARY OSTEOARTHRITIS, RIGHT KNEE: ICD-10-CM

## 2025-07-31 PROCEDURE — 20610 DRAIN/INJ JOINT/BURSA W/O US: CPT | Mod: RT

## 2025-07-31 PROCEDURE — 20611 DRAIN/INJ JOINT/BURSA W/US: CPT | Mod: RT

## 2025-07-31 PROCEDURE — 99214 OFFICE O/P EST MOD 30 MIN: CPT | Mod: 25

## 2025-08-07 ENCOUNTER — APPOINTMENT (OUTPATIENT)
Dept: ORTHOPEDIC SURGERY | Facility: CLINIC | Age: 75
End: 2025-08-07
Payer: MEDICARE

## 2025-08-07 VITALS — WEIGHT: 174 LBS | BODY MASS INDEX: 25.77 KG/M2 | HEIGHT: 69 IN

## 2025-08-07 PROCEDURE — 20610 DRAIN/INJ JOINT/BURSA W/O US: CPT | Mod: RT

## 2025-08-07 PROCEDURE — 20611 DRAIN/INJ JOINT/BURSA W/US: CPT | Mod: RT

## 2025-08-14 ENCOUNTER — APPOINTMENT (OUTPATIENT)
Dept: ORTHOPEDIC SURGERY | Facility: CLINIC | Age: 75
End: 2025-08-14
Payer: MEDICARE

## 2025-08-14 DIAGNOSIS — M25.561 PAIN IN RIGHT KNEE: ICD-10-CM

## 2025-08-14 DIAGNOSIS — M17.10 UNILATERAL PRIMARY OSTEOARTHRITIS, UNSPECIFIED KNEE: ICD-10-CM

## 2025-08-14 PROCEDURE — 99213 OFFICE O/P EST LOW 20 MIN: CPT | Mod: 25

## 2025-08-14 PROCEDURE — 20611 DRAIN/INJ JOINT/BURSA W/US: CPT | Mod: RT

## 2025-08-14 PROCEDURE — 20610 DRAIN/INJ JOINT/BURSA W/O US: CPT | Mod: RT
